# Patient Record
Sex: FEMALE | Race: WHITE | Employment: OTHER | URBAN - NONMETROPOLITAN AREA
[De-identification: names, ages, dates, MRNs, and addresses within clinical notes are randomized per-mention and may not be internally consistent; named-entity substitution may affect disease eponyms.]

---

## 2017-05-25 ENCOUNTER — OFFICE VISIT (OUTPATIENT)
Dept: NEUROLOGY | Age: 77
End: 2017-05-25
Payer: MEDICARE

## 2017-05-25 VITALS
DIASTOLIC BLOOD PRESSURE: 80 MMHG | HEIGHT: 63 IN | HEART RATE: 67 BPM | BODY MASS INDEX: 26.4 KG/M2 | WEIGHT: 149 LBS | SYSTOLIC BLOOD PRESSURE: 120 MMHG | OXYGEN SATURATION: 97 %

## 2017-05-25 DIAGNOSIS — G20 PARKINSON'S DISEASE (HCC): Primary | ICD-10-CM

## 2017-05-25 DIAGNOSIS — R26.9 GAIT ABNORMALITY: ICD-10-CM

## 2017-05-25 DIAGNOSIS — R25.1 TREMOR: ICD-10-CM

## 2017-05-25 PROCEDURE — 99204 OFFICE O/P NEW MOD 45 MIN: CPT | Performed by: PSYCHIATRY & NEUROLOGY

## 2017-11-27 ENCOUNTER — OFFICE VISIT (OUTPATIENT)
Dept: NEUROLOGY | Age: 77
End: 2017-11-27
Payer: MEDICARE

## 2017-11-27 VITALS
BODY MASS INDEX: 27.28 KG/M2 | DIASTOLIC BLOOD PRESSURE: 69 MMHG | WEIGHT: 154 LBS | HEART RATE: 77 BPM | SYSTOLIC BLOOD PRESSURE: 103 MMHG

## 2017-11-27 DIAGNOSIS — R25.1 TREMOR: ICD-10-CM

## 2017-11-27 DIAGNOSIS — R26.9 GAIT ABNORMALITY: ICD-10-CM

## 2017-11-27 DIAGNOSIS — G20 PARKINSON'S DISEASE (HCC): Primary | ICD-10-CM

## 2017-11-27 PROCEDURE — 99214 OFFICE O/P EST MOD 30 MIN: CPT | Performed by: PSYCHIATRY & NEUROLOGY

## 2017-11-27 NOTE — PROGRESS NOTES
Review of Systems    Constitutional  No fever or chills. No diaphoresis or significant fatigue. HENT   No tinnitus or significant hearing loss. Eyes  no sudden vision change or eye pain  Respiratory  no significant shortness of breath or cough  Cardiovascular  no chest pain No palpitations or significant leg swelling  Gastrointestinal  no abdominal swelling or pain. Genitourinary  No difficulty urinating, dysuria  Musculoskeletal  yes back pain or myalgia. Skin  no color change or rash  Neurologic  No seizures. No lateralizing weakness. Hematologic  no easy bruising or excessive bleeding. Psychiatric  no severe anxiety or nervousness. All other review of systems are negative.

## 2017-11-27 NOTE — PROGRESS NOTES
tinnitus or significant hearing loss. Eyes  no sudden vision change or eye pain  Respiratory  no significant shortness of breath or cough  Cardiovascular  no chest pain No palpitations or significant leg swelling  Gastrointestinal  no abdominal swelling or pain. Genitourinary  No difficulty urinating, dysuria  Musculoskeletal  yes back pain or myalgia. Skin  no color change or rash  Neurologic  No seizures. No lateralizing weakness. Hematologic  no easy bruising or excessive bleeding. Psychiatric  no severe anxiety or nervousness. All other review of systems are negative. Current Outpatient Prescriptions   Medication Sig Dispense Refill    carbidopa-levodopa (SINEMET)  MG per tablet Take 2 tablets by mouth 3 times daily 180 tablet 11     No current facility-administered medications for this visit. /69   Pulse 77   Wt 154 lb (69.9 kg)   BMI 27.28 kg/m²     Constitutional  well developed, well nourished. Eyes  conjunctiva normal.   Ear, nose, throat - No scars, masses, or lesions over external nose or ears, no atrophy of tongue  Neck-symmetric, no masses noted, no jugular vein distension  Respiration- chest wall appears symmetric, good expansion,   normal effort without use of accessory muscles  Musculoskeletal  no significant wasting of muscles noted, no bony deformities  Extremities-no clubbing, cyanosis or edema  Skin  warm, dry, and intact. No rash, erythema, or pallor.   Psychiatric  mood, affect, and behavior appear normal.      Neurological exam  Awake, alert, fluent oriented x 3 appropriate affect  Attention and concentration appear appropriate  Recent and remote memory appears unremarkable  Speech normal without dysarthria  No clear issues with language of fund of knowledge    Cranial Nerve Exam     CN III, IV,VI-EOMI, No nystagmus, conjugate eye movements, no ptosis    CN VII-no facial assymetry    Motor Exam  V/V throughout upper and lower extremities bilaterally, some cogwheeling in the wrists, normal tone      Tremors- resting tremor in both arms    Gait  Slightly slow with decreased arm swing and resting tremor in both arms        No results found for: [id number]  No results found for: WBC, HGB, HCT, MCV, PLT  No results found for: NA, K, CL, CO2, BUN, CREATININE, GLUCOSE, CALCIUM, PROT, LABALBU, BILITOT, ALKPHOS, AST, ALT, LABGLOM, GFRAA, AGRATIO, GLOB        Assessment    ICD-10-CM ICD-9-CM    1. [facility] G20 332.0    2. Tremor R25.1 781.0    3. Gait abnormality R26.9 781.2        Her neurological examination today was significant for a resting tremor in both hands. She had some mild cogwheeling. Her gait was slightly slowed with reduced arm swing. Based upon her history and examination, her symptoms appear to be consistent with Parkinson's disease. At this time. She was instructed to take her dose of medicines at 7 AM, noon, and 5 PM rather than at bedtime. She is to continue with her parkinsons boxing therapy. Continue present care. No further recommendations are provided. She is to follow-up with me in approximately 6 months and call with any further problems. Plan    No orders of the defined types were placed in this encounter. No orders of the defined types were placed in this encounter. Return in about 6 months (around 5/27/2018).

## 2018-07-25 ENCOUNTER — TELEPHONE (OUTPATIENT)
Dept: NEUROLOGY | Age: 78
End: 2018-07-25

## 2018-08-23 ENCOUNTER — OFFICE VISIT (OUTPATIENT)
Dept: NEUROLOGY | Age: 78
End: 2018-08-23
Payer: MEDICARE

## 2018-08-23 VITALS
SYSTOLIC BLOOD PRESSURE: 134 MMHG | HEIGHT: 64 IN | BODY MASS INDEX: 25.97 KG/M2 | DIASTOLIC BLOOD PRESSURE: 81 MMHG | WEIGHT: 152.13 LBS | HEART RATE: 97 BPM

## 2018-08-23 DIAGNOSIS — R25.1 TREMOR: ICD-10-CM

## 2018-08-23 DIAGNOSIS — G20 PARKINSON'S DISEASE (HCC): Primary | ICD-10-CM

## 2018-08-23 DIAGNOSIS — R26.9 GAIT ABNORMALITY: ICD-10-CM

## 2018-08-23 PROCEDURE — 99214 OFFICE O/P EST MOD 30 MIN: CPT | Performed by: PSYCHIATRY & NEUROLOGY

## 2018-08-23 NOTE — PROGRESS NOTES
Chief Complaint   Patient presents with    Tremors     6 month follow up        Ramona Hill is a 69y.o. year old female who is seen for evaluation of Parkinson's disease. The patient indicates that she was diagnosed with Parkinson's disease in 2009. At that time she had some neck stiffness, along with some numbness in the left arm. She had tremor but soon developed, in both arms. Her walking slightly slowed. She was seen by Dr. Alida Sharma in [facility]. She indicates she had an MRI of the brain, which is unremarkable. There is no family history of Parkinson's. She denies any significant balance issues, although her gait is slower. She denies any dysphasia or dysarthria. There are no cognitive issues. She was started on Sinemet and slowly this was increased. She is currently on 2 tablets 3 times a day. Doing better taking her meds at 5 pm. Feels symptoms are stable. Active Ambulatory Problems     Diagnosis Date Noted    Parkinson's disease ([facility] 05/25/2017    Tremor 05/25/2017    Gait abnormality 05/25/2017     Resolved Ambulatory Problems     Diagnosis Date Noted    No Resolved Ambulatory Problems     Past Medical History:   Diagnosis Date    Parkinson's disease [facility])        Past Surgical History:   Procedure Laterality Date    APPENDECTOMY      CHOLECYSTECTOMY      HYSTERECTOMY         History reviewed. No pertinent family history. No Known Allergies    Social History     Social History    Marital status: Unknown     Spouse name: N/A    Number of children: N/A    Years of education: N/A     Occupational History    Not on file. Social History Main Topics    Smoking status: Never Smoker    Smokeless tobacco: Never Used    Alcohol use Yes      Comment: occas    Drug use: Unknown    Sexual activity: Not on file     Other Topics Concern    Not on file     Social History Narrative    No narrative on file       Review of Systems     Constitutional  No fever or chills.   No diaphoresis or assymetry    Motor Exam  V/V throughout upper and lower extremities bilaterally, some cogwheeling in the wrists, normal tone      Tremors- resting tremor in both arms    Gait  Slightly slow with decreased arm swing and resting tremor in both arms        No results found for: [id number]  No results found for: WBC, HGB, HCT, MCV, PLT  No results found for: NA, K, CL, CO2, BUN, CREATININE, GLUCOSE, CALCIUM, PROT, LABALBU, BILITOT, ALKPHOS, AST, ALT, LABGLOM, GFRAA, AGRATIO, GLOB        Assessment    ICD-10-CM ICD-9-CM    1. Parkinson's disease ([facility] G20 332.0    2. Tremor R25.1 781.0    3. Gait abnormality R26.9 781.2        Her neurological examination today was significant for a resting tremor in both hands. She had some mild cogwheeling. Her gait was slightly slowed with reduced arm swing. Based upon her history and examination, her symptoms appear to be consistent with Parkinson's disease. At this time. She was instructed to take her dose of medicines at 7 AM, noon, and 5 PM rather than at bedtime. She is to continue with her parkinsons boxing therapy. Continue currencare. No further recommendations are provided. She is to follow-up with me in approximately 6 months and call with any further problems. Plan    No orders of the defined types were placed in this encounter. Orders Placed This Encounter   Medications    carbidopa-levodopa (SINEMET)  MG per tablet     Sig: Take 2 tablets by mouth 3 times daily     Dispense:  540 tablet     Refill:  3       Return in about 6 months (around 2/23/2019).

## 2019-02-26 ENCOUNTER — OFFICE VISIT (OUTPATIENT)
Dept: NEUROLOGY | Age: 79
End: 2019-02-26
Payer: MEDICARE

## 2019-02-26 VITALS
BODY MASS INDEX: 25.44 KG/M2 | DIASTOLIC BLOOD PRESSURE: 76 MMHG | WEIGHT: 149 LBS | HEART RATE: 91 BPM | HEIGHT: 64 IN | SYSTOLIC BLOOD PRESSURE: 115 MMHG

## 2019-02-26 DIAGNOSIS — R25.1 TREMOR: ICD-10-CM

## 2019-02-26 DIAGNOSIS — G20 PARKINSON'S DISEASE (HCC): Primary | ICD-10-CM

## 2019-02-26 DIAGNOSIS — R26.9 GAIT ABNORMALITY: ICD-10-CM

## 2019-02-26 PROCEDURE — 99213 OFFICE O/P EST LOW 20 MIN: CPT | Performed by: PSYCHIATRY & NEUROLOGY

## 2021-06-27 ENCOUNTER — HOSPITAL ENCOUNTER (INPATIENT)
Age: 81
LOS: 4 days | Discharge: NURSING, REHAB OR LONG-TERM CARE | DRG: 481 | End: 2021-07-01
Attending: HOSPITALIST
Payer: MEDICARE

## 2021-06-27 ENCOUNTER — APPOINTMENT (OUTPATIENT)
Dept: GENERAL RADIOLOGY | Age: 81
DRG: 481 | End: 2021-06-27
Attending: HOSPITALIST
Payer: MEDICARE

## 2021-06-27 DIAGNOSIS — S72.002A CLOSED LEFT HIP FRACTURE, INITIAL ENCOUNTER (HCC): Primary | ICD-10-CM

## 2021-06-27 LAB
ABO/RH: NORMAL
ALBUMIN SERPL-MCNC: 3.9 G/DL (ref 3.5–5.2)
ALP BLD-CCNC: 68 U/L (ref 35–104)
ALT SERPL-CCNC: 9 U/L (ref 5–33)
ANION GAP SERPL CALCULATED.3IONS-SCNC: 11 MMOL/L (ref 7–19)
ANTIBODY SCREEN: NORMAL
APTT: 24.5 SEC (ref 26–36.2)
AST SERPL-CCNC: 20 U/L (ref 5–32)
BASOPHILS ABSOLUTE: 0 K/UL (ref 0–0.2)
BASOPHILS RELATIVE PERCENT: 0.3 % (ref 0–1)
BILIRUB SERPL-MCNC: 0.7 MG/DL (ref 0.2–1.2)
BUN BLDV-MCNC: 15 MG/DL (ref 8–23)
CALCIUM SERPL-MCNC: 8.7 MG/DL (ref 8.8–10.2)
CHLORIDE BLD-SCNC: 105 MMOL/L (ref 98–111)
CO2: 25 MMOL/L (ref 22–29)
CREAT SERPL-MCNC: 0.5 MG/DL (ref 0.5–0.9)
EOSINOPHILS ABSOLUTE: 0 K/UL (ref 0–0.6)
EOSINOPHILS RELATIVE PERCENT: 0.2 % (ref 0–5)
GFR AFRICAN AMERICAN: >59
GFR NON-AFRICAN AMERICAN: >60
GLUCOSE BLD-MCNC: 110 MG/DL (ref 74–109)
HCT VFR BLD CALC: 35.4 % (ref 37–47)
HEMOGLOBIN: 11.5 G/DL (ref 12–16)
IMMATURE GRANULOCYTES #: 0.1 K/UL
INR BLD: 0.98 (ref 0.88–1.18)
LYMPHOCYTES ABSOLUTE: 1.3 K/UL (ref 1.1–4.5)
LYMPHOCYTES RELATIVE PERCENT: 11.1 % (ref 20–40)
MCH RBC QN AUTO: 30.2 PG (ref 27–31)
MCHC RBC AUTO-ENTMCNC: 32.5 G/DL (ref 33–37)
MCV RBC AUTO: 92.9 FL (ref 81–99)
MONOCYTES ABSOLUTE: 0.8 K/UL (ref 0–0.9)
MONOCYTES RELATIVE PERCENT: 7.3 % (ref 0–10)
NEUTROPHILS ABSOLUTE: 9.1 K/UL (ref 1.5–7.5)
NEUTROPHILS RELATIVE PERCENT: 80.6 % (ref 50–65)
PDW BLD-RTO: 13.7 % (ref 11.5–14.5)
PLATELET # BLD: 248 K/UL (ref 130–400)
PMV BLD AUTO: 10 FL (ref 9.4–12.3)
POTASSIUM REFLEX MAGNESIUM: 4.7 MMOL/L (ref 3.5–5)
PROTHROMBIN TIME: 13.2 SEC (ref 12–14.6)
RBC # BLD: 3.81 M/UL (ref 4.2–5.4)
SODIUM BLD-SCNC: 141 MMOL/L (ref 136–145)
TOTAL PROTEIN: 7.1 G/DL (ref 6.6–8.7)
VITAMIN D 25-HYDROXY: 14.8 NG/ML
WBC # BLD: 11.3 K/UL (ref 4.8–10.8)

## 2021-06-27 PROCEDURE — 85610 PROTHROMBIN TIME: CPT

## 2021-06-27 PROCEDURE — 86900 BLOOD TYPING SEROLOGIC ABO: CPT

## 2021-06-27 PROCEDURE — 6360000002 HC RX W HCPCS: Performed by: STUDENT IN AN ORGANIZED HEALTH CARE EDUCATION/TRAINING PROGRAM

## 2021-06-27 PROCEDURE — 6370000000 HC RX 637 (ALT 250 FOR IP): Performed by: HOSPITALIST

## 2021-06-27 PROCEDURE — 86901 BLOOD TYPING SEROLOGIC RH(D): CPT

## 2021-06-27 PROCEDURE — 2580000003 HC RX 258: Performed by: STUDENT IN AN ORGANIZED HEALTH CARE EDUCATION/TRAINING PROGRAM

## 2021-06-27 PROCEDURE — 86850 RBC ANTIBODY SCREEN: CPT

## 2021-06-27 PROCEDURE — 2580000003 HC RX 258: Performed by: HOSPITALIST

## 2021-06-27 PROCEDURE — 82306 VITAMIN D 25 HYDROXY: CPT

## 2021-06-27 PROCEDURE — 1210000000 HC MED SURG R&B

## 2021-06-27 PROCEDURE — 36415 COLL VENOUS BLD VENIPUNCTURE: CPT

## 2021-06-27 PROCEDURE — 73521 X-RAY EXAM HIPS BI 2 VIEWS: CPT

## 2021-06-27 PROCEDURE — 73552 X-RAY EXAM OF FEMUR 2/>: CPT

## 2021-06-27 PROCEDURE — 85730 THROMBOPLASTIN TIME PARTIAL: CPT

## 2021-06-27 PROCEDURE — 80053 COMPREHEN METABOLIC PANEL: CPT

## 2021-06-27 PROCEDURE — 85025 COMPLETE CBC W/AUTO DIFF WBC: CPT

## 2021-06-27 PROCEDURE — 6370000000 HC RX 637 (ALT 250 FOR IP): Performed by: STUDENT IN AN ORGANIZED HEALTH CARE EDUCATION/TRAINING PROGRAM

## 2021-06-27 RX ORDER — LANOLIN ALCOHOL/MO/W.PET/CERES
50 CREAM (GRAM) TOPICAL DAILY
COMMUNITY

## 2021-06-27 RX ORDER — ONDANSETRON 4 MG/1
4 TABLET, ORALLY DISINTEGRATING ORAL EVERY 8 HOURS PRN
Status: DISCONTINUED | OUTPATIENT
Start: 2021-06-27 | End: 2021-06-28 | Stop reason: SDUPTHER

## 2021-06-27 RX ORDER — M-VIT,TX,IRON,MINS/CALC/FOLIC 27MG-0.4MG
1 TABLET ORAL DAILY
COMMUNITY

## 2021-06-27 RX ORDER — MECOBALAMIN 5000 MCG
5 TABLET,DISINTEGRATING ORAL NIGHTLY PRN
Status: DISCONTINUED | OUTPATIENT
Start: 2021-06-27 | End: 2021-07-01 | Stop reason: HOSPADM

## 2021-06-27 RX ORDER — SODIUM CHLORIDE 9 MG/ML
25 INJECTION, SOLUTION INTRAVENOUS PRN
Status: DISCONTINUED | OUTPATIENT
Start: 2021-06-27 | End: 2021-06-28 | Stop reason: SDUPTHER

## 2021-06-27 RX ORDER — ONDANSETRON 2 MG/ML
4 INJECTION INTRAMUSCULAR; INTRAVENOUS EVERY 6 HOURS PRN
Status: DISCONTINUED | OUTPATIENT
Start: 2021-06-27 | End: 2021-06-28 | Stop reason: SDUPTHER

## 2021-06-27 RX ORDER — NALOXONE HYDROCHLORIDE 0.4 MG/ML
0.4 INJECTION, SOLUTION INTRAMUSCULAR; INTRAVENOUS; SUBCUTANEOUS PRN
Status: DISCONTINUED | OUTPATIENT
Start: 2021-06-27 | End: 2021-07-01 | Stop reason: HOSPADM

## 2021-06-27 RX ORDER — SODIUM CHLORIDE 0.9 % (FLUSH) 0.9 %
5-40 SYRINGE (ML) INJECTION EVERY 12 HOURS SCHEDULED
Status: DISCONTINUED | OUTPATIENT
Start: 2021-06-27 | End: 2021-06-28 | Stop reason: SDUPTHER

## 2021-06-27 RX ORDER — SODIUM CHLORIDE 9 MG/ML
INJECTION, SOLUTION INTRAVENOUS CONTINUOUS
Status: DISCONTINUED | OUTPATIENT
Start: 2021-06-27 | End: 2021-07-01 | Stop reason: HOSPADM

## 2021-06-27 RX ORDER — MORPHINE SULFATE 4 MG/ML
4 INJECTION, SOLUTION INTRAMUSCULAR; INTRAVENOUS EVERY 4 HOURS PRN
Status: DISCONTINUED | OUTPATIENT
Start: 2021-06-27 | End: 2021-07-01 | Stop reason: HOSPADM

## 2021-06-27 RX ORDER — MORPHINE SULFATE 4 MG/ML
2 INJECTION, SOLUTION INTRAMUSCULAR; INTRAVENOUS EVERY 4 HOURS PRN
Status: DISCONTINUED | OUTPATIENT
Start: 2021-06-27 | End: 2021-07-01 | Stop reason: HOSPADM

## 2021-06-27 RX ORDER — MORPHINE SULFATE 4 MG/ML
2 INJECTION, SOLUTION INTRAMUSCULAR; INTRAVENOUS
Status: DISCONTINUED | OUTPATIENT
Start: 2021-06-27 | End: 2021-06-27

## 2021-06-27 RX ORDER — SODIUM CHLORIDE 0.9 % (FLUSH) 0.9 %
5-40 SYRINGE (ML) INJECTION PRN
Status: DISCONTINUED | OUTPATIENT
Start: 2021-06-27 | End: 2021-06-28 | Stop reason: SDUPTHER

## 2021-06-27 RX ORDER — POLYETHYLENE GLYCOL 3350 17 G/17G
17 POWDER, FOR SOLUTION ORAL DAILY PRN
Status: DISCONTINUED | OUTPATIENT
Start: 2021-06-27 | End: 2021-07-01 | Stop reason: HOSPADM

## 2021-06-27 RX ADMIN — Medication 2 MG: at 09:24

## 2021-06-27 RX ADMIN — SODIUM CHLORIDE: 9 INJECTION, SOLUTION INTRAVENOUS at 20:01

## 2021-06-27 RX ADMIN — CARBIDOPA AND LEVODOPA 2 TABLET: 25; 100 TABLET ORAL at 22:15

## 2021-06-27 RX ADMIN — Medication 2 MG: at 13:41

## 2021-06-27 RX ADMIN — CARBIDOPA AND LEVODOPA 2 TABLET: 25; 100 TABLET ORAL at 17:49

## 2021-06-27 RX ADMIN — Medication 4 MG: at 22:15

## 2021-06-27 RX ADMIN — SODIUM CHLORIDE, PRESERVATIVE FREE 10 ML: 5 INJECTION INTRAVENOUS at 22:15

## 2021-06-27 RX ADMIN — Medication 5 MG: at 22:15

## 2021-06-27 RX ADMIN — Medication 4 MG: at 17:38

## 2021-06-27 ASSESSMENT — PAIN SCALES - GENERAL
PAINLEVEL_OUTOF10: 0
PAINLEVEL_OUTOF10: 10
PAINLEVEL_OUTOF10: 5
PAINLEVEL_OUTOF10: 10
PAINLEVEL_OUTOF10: 0
PAINLEVEL_OUTOF10: 9
PAINLEVEL_OUTOF10: 0

## 2021-06-27 NOTE — PLAN OF CARE
ED Sign Out:    Left Transcervical Femoral Neck Fracture  Orthopaedic service not at [facility]  Referral from Dr. Melissa Flores  Pt Dx:  Left Hip Fracture  Was at home, foot gave way and left hip popped  No orther injuries  \"Wbc 78  Hb 11  BMP all normal  Only med is sinemet\"  EMS gave her fenatnyl 75  Dilaudid 2 at OSH ED  Tanvir@[email or web address] prior to dilaudid, now 148mmHg  RR 19  SpO2 96% on RA      Preliminary Assessments & Plans:    Left Transcervical Hip Fracture:  Admit to [facility]  Narcan PRN patient safety   Dilaudid Pain Scale - NEEDS ORDERED  Type and Screen and PT/INR and PTT and CBC with Diff and CMP today on arrival  CBC and BMP with Mag reflex tomorrow  Ortho consult in AM  NPO from arrival  Ghotra Care  Insert Ghotra    Supportive and Prophylactic Txx:  DVT PPx: Lovenox SQ  GI (PUD) PPx: not indicated  PT: defer to ortho

## 2021-06-27 NOTE — CONSULTS
Orthopaedic Surgery  Inpatient Consultation    Brian Chao ([id number])  6/27/2021      CHIEF COMPLAINT: Left hip pain    History Obtained From: Patient    HISTORY OF PRESENT ILLNESS:                The patient is a 79 y.o. female consulted to the orthopedic service for a left intertrochanteric femur fracture. Patient lives at home with her  she tripped and fell while at home and sustained a left intertrochanteric hip fracture. She denies any other injuries. She lives with her  who has dementia. Past Medical History:        Diagnosis Date    Parkinson's disease [facility])        Past Surgical History:        Procedure Laterality Date    APPENDECTOMY      CHOLECYSTECTOMY      HYSTERECTOMY         Current Medications:   Current Facility-Administered Medications: naloxone (NARCAN) injection 0.4 mg, 0.4 mg, Intravenous, PRN  sodium chloride flush 0.9 % injection 5-40 mL, 5-40 mL, Intravenous, 2 times per day  sodium chloride flush 0.9 % injection 5-40 mL, 5-40 mL, Intravenous, PRN  0.9 % sodium chloride infusion, 25 mL, Intravenous, PRN  [Held by provider] enoxaparin (LOVENOX) injection 40 mg, 40 mg, Subcutaneous, Daily  ondansetron (ZOFRAN-ODT) disintegrating tablet 4 mg, 4 mg, Oral, Q8H PRN **OR** ondansetron (ZOFRAN) injection 4 mg, 4 mg, Intravenous, Q6H PRN  polyethylene glycol (GLYCOLAX) packet 17 g, 17 g, Oral, Daily PRN  melatonin disintegrating tablet 5 mg, 5 mg, Oral, Nightly PRN  morphine injection 2 mg, 2 mg, Intravenous, Q3H PRN  [START ON 6/28/2021] carbidopa-levodopa (SINEMET)  MG per tablet 2 tablet, 2 tablet, Oral, TID  Prior to Admission medications    Medication Sig Start Date End Date Taking? Authorizing Provider   carbidopa-levodopa (SINEMET)  MG per tablet TAKE 2 TABLETS BY MOUTH 3 TIMES A DAY 9/9/19   Nghia Wu MD       Allergies:  Patient has no known allergies.     Social History:   Social History     Socioeconomic History    Marital status: Unknown Spouse name: Not on file    Number of children: Not on file    Years of education: Not on file    Highest education level: Not on file   Occupational History    Not on file   Tobacco Use    Smoking status: Never Smoker    Smokeless tobacco: Never Used   Substance and Sexual Activity    Alcohol use: Yes     Comment: occas    Drug use: Not on file    Sexual activity: Not on file   Other Topics Concern    Not on file   Social History Narrative    Not on file     Social Determinants of Health     Financial Resource Strain:     Difficulty of Paying Living Expenses:    Food Insecurity:     Worried About Running Out of Food in the Last Year:     [address] in the Last Year:    Transportation Needs:     Lack of Transportation (Medical):  Lack of Transportation (Non-Medical):    Physical Activity:     Days of Exercise per Week:     Minutes of Exercise per Session:    Stress:     Feeling of Stress :    Social Connections:     Frequency of Communication with Friends and Family:     Frequency of Social Gatherings with Friends and Family:     Attends Taoism Services:     Active Member of Clubs or Organizations:     Attends Club or Organization Meetings:     Marital Status:    Intimate Partner Violence:     Fear of Current or Ex-Partner:     Emotionally Abused:     Physically Abused:     Sexually Abused:        Family History:   No family history on file.     REVIEW OF SYSTEMS:    CONSTITUTIONAL:  negative for  fevers, chills, sweats, fatigue, malaise, anorexia and weight loss  EYES:  negative for  double vision, blurred vision and visual disturbance  HEENT:  negative for  hearing loss, tinnitus, ear drainage, earaches, nasal congestion, epistaxis, snoring, sore mouth, sore throat, hoarseness and voice change  RESPIRATORY:  negative for  dry cough, cough with sputum, dyspnea, wheezing, hemoptysis, chest pain, pleuritic pain and cyanosis  CARDIOVASCULAR:  negative for  chest pain, palpitations, orthopnea, exertional chest pressure/discomfort, edema  GASTROINTESTINAL:  negative for nausea, vomiting, change in bowel habits, diarrhea, constipation, abdominal pain, jaundice, dysphagia, regurgitation, hematemesis and hemtochezia  GENITOURINARY:  negative for frequency, dysuria, nocturia, hesitancy and hematuria  INTEGUMENT/BREAST:  negative for rash, skin lesion(s), dryness, skin color change, pruritus, changes in hair and changes in nails  HEMATOLOGIC/LYMPHATIC:  negative for easy bruising, bleeding, lymphadenopathy, petechiae and swelling/edema  ALLERGIC/IMMUNOLOGIC:  negative for recurrent infections and urticaria  ENDOCRINE:  negative for heat intolerance, cold intolerance, tremor, weight changes, hair loss and diabetic symptoms including neither polyuria nor polydipsia  MUSCULOSKELETAL: Left hip pain  NEUROLOGICAL:  negative for headaches, dizziness, seizures, memory problems, speech problems, visual disturbance, coordination problems, gait problems, tremor, syncope and near syncope  BEHAVIOR/PSYCH:  negative for depressed mood, elated mood, increased agitation and anxiety    PHYSICAL EXAM:    Vitals:   Vitals:    06/27/21 0830 06/27/21 1036   BP: 125/82 134/82   Pulse: 100 92   Resp: 18 16   Temp: 97.8 °F (36.6 °C) 97.3 °F (36.3 °C)   TempSrc: Temporal Temporal   SpO2:  97%     General:  Appears stated age, no distress. Orientation:  Alert and oriented to time, place, and person. Mood and Affect:  Cooperative and pleasant. Gait:  Resting comfortably in bed. Cardiovascular:  Symmetric 1-2 plus pulses in upper and lower extremities. Lymph:  No cervical or inguinal lymphadenopathy noted. Sensation:  Grossly intact to light touch. DTR:  Normal, no pathologic reflexes. Coordination/balance:  Normal    Musculoskeletal:  Left hip shortened and externally rotated.       DATA:    CBC with Differential:    Lab Results   Component Value Date    WBC 11.3 06/27/2021    RBC 3.81 06/27/2021    HGB 11.5 06/27/2021    HCT 35.4 06/27/2021     06/27/2021    MCV 92.9 06/27/2021    MCH 30.2 06/27/2021    MCHC 32.5 06/27/2021    RDW 13.7 06/27/2021    LYMPHOPCT 11.1 06/27/2021    MONOPCT 7.3 06/27/2021    BASOPCT 0.3 06/27/2021    MONOSABS 0.80 06/27/2021    LYMPHSABS 1.3 06/27/2021    EOSABS 0.00 06/27/2021    BASOSABS 0.00 06/27/2021     CMP:    Lab Results   Component Value Date     06/27/2021    K 4.7 06/27/2021     06/27/2021    CO2 25 06/27/2021    BUN 15 06/27/2021    CREATININE 0.5 06/27/2021    GFRAA >59 06/27/2021    LABGLOM >60 06/27/2021    GLUCOSE 110 06/27/2021    PROT 7.1 06/27/2021    CALCIUM 8.7 06/27/2021    BILITOT 0.7 06/27/2021    ALKPHOS 68 06/27/2021    AST 20 06/27/2021    ALT 9 06/27/2021     BMP:    Lab Results   Component Value Date     06/27/2021    K 4.7 06/27/2021     06/27/2021    CO2 25 06/27/2021    BUN 15 06/27/2021    CREATININE 0.5 06/27/2021    CALCIUM 8.7 06/27/2021    GFRAA >59 06/27/2021    LABGLOM >60 06/27/2021    GLUCOSE 110 06/27/2021       Radiology: Left intertrochanteric hip fracture      IMPRESSION/RECOMMENDATIONS:    Assessment: Displaced left intertrochanteric femur fracture    Plan:  1. Cephallomedullary nailing of a displaced left intertrochanteric femur fracture  Monday morning. 2.  NPO after MN.     Dione Beavers MD 06/27/21 11:30 AM

## 2021-06-27 NOTE — H&P
Physical Activity:     Days of Exercise per Week:     Minutes of Exercise per Session:    Stress:     Feeling of Stress :    Social Connections:     Frequency of Communication with Friends and Family:     Frequency of Social Gatherings with Friends and Family:     Attends Jainism Services:     Active Member of Clubs or Organizations:     Attends Club or Organization Meetings:     Marital Status:    Intimate Partner Violence:     Fear of Current or Ex-Partner:     Emotionally Abused:     Physically Abused:     Sexually Abused:      No Known Allergies  Prior to Admission medications    Medication Sig Start Date End Date Taking? Authorizing Provider   carbidopa-levodopa (SINEMET)  MG per tablet TAKE 2 TABLETS BY MOUTH 3 TIMES A DAY 9/9/19   Martin Brown MD       I have reviewed all pertinent history. Prior medical records and laboratory evaluation reviewed. Imaging independently reviewed. Review of Systems:   As per HPI, otherwise all other ROS performed and found to be negative at this time. Physical Exam:  There were no vitals filed for this visit. CONSTITUTIONAL: Uncomfortable due to pain, but no distress  PSYCH: Judgement and insight are normal. Mental status alert and oriented to person, place and time. Mood and affect are normal  EYES: KYUNG, symmetrical. Conjunctiva are moist, non-icteric. Lids are normal  ENT: No abnormalities  NECK: Trachea is midline. Supple, nontender  Head: Normocephalic, atraumatic  LUNGS: Normal respiratory effort, no intercostal retractions or accessory muscle use. Clear to auscultation bilaterally with no crackles, wheezes or rales  CARDIOVASCULAR: Normal rate, regular rhythm, pulses equal  GI/ABDOMEN: Soft, non-tender, non-distended, no guarding or rebound. Bowel sounds are normal.   SKIN: Warm and dry.   No rashes  NEUROLOGICAL: Tremor at baseline, mild cogwheel rigidity, alert, follows commands, speech fluent  EXTREMITIES: Limited ROM and tenderness of left hip    Labs: No results found for this or any previous visit (from the past 72 hour(s)). Imaging: No results found. Assessment/Plan:     Principal Problem:    Closed left hip fracture, initial encounter [facility])  Active Problems:    Parkinson's disease ([address]  Resolved Problems:    * No resolved hospital problems.  *       Hip fracture  -Surgical intervention per orthopedic surgery planned for 6/28  -Bedrest  -Pain control  -Supportive care  -PT/OT when appropriate after surgery    Parkinson's disease  -Continue home Sinemet as appropriate    Monitor labs    N.p.o. after midnight    DVT prophylaxis-  Hold  for surgery    Mana Breen MD  6/27/2021 8:40 AM

## 2021-06-27 NOTE — PROGRESS NOTES
PHYSICAL THERAPY        DATE: 6/27/2021    Received order for physical therapy evaluation. The evaluation was attempted but was unable to be completed due to:     [x] The patient currently has an order for bed rest and has an ortho consult to evaluate hip fracture. We will follow up per ortho orders after consult. [] The patient declined.     [] The patient was unavailable. [] Nursing declined.     [] Waiting on clarification orders from Ortho / Neuro     [] The patient is currently restrained. Due to facility policy on restraints physical therapy is deferred when a patient is restrained.         Electronically signed by Carrol Mendez PT on 6/27/2021 at 9:39 AM

## 2021-06-27 NOTE — LETTER
Jenae Vernon,  at [facility]  [phone] phone  [phone] fax  [phone] CELL ([address])        Jenae Vernon  at [place] [phone] phone  [phone] fax  [phone] CELL ([address])

## 2021-06-28 ENCOUNTER — ANESTHESIA (OUTPATIENT)
Dept: OPERATING ROOM | Age: 81
DRG: 481 | End: 2021-06-28
Payer: MEDICARE

## 2021-06-28 ENCOUNTER — APPOINTMENT (OUTPATIENT)
Dept: GENERAL RADIOLOGY | Age: 81
DRG: 481 | End: 2021-06-28
Attending: HOSPITALIST
Payer: MEDICARE

## 2021-06-28 ENCOUNTER — ANESTHESIA EVENT (OUTPATIENT)
Dept: OPERATING ROOM | Age: 81
DRG: 481 | End: 2021-06-28
Payer: MEDICARE

## 2021-06-28 VITALS — TEMPERATURE: 97.7 F | SYSTOLIC BLOOD PRESSURE: 123 MMHG | OXYGEN SATURATION: 100 % | DIASTOLIC BLOOD PRESSURE: 74 MMHG

## 2021-06-28 LAB
ANION GAP SERPL CALCULATED.3IONS-SCNC: 10 MMOL/L (ref 7–19)
BUN BLDV-MCNC: 15 MG/DL (ref 8–23)
CALCIUM SERPL-MCNC: 8 MG/DL (ref 8.8–10.2)
CHLORIDE BLD-SCNC: 103 MMOL/L (ref 98–111)
CO2: 26 MMOL/L (ref 22–29)
CREAT SERPL-MCNC: 0.5 MG/DL (ref 0.5–0.9)
EKG P AXIS: 59 DEGREES
EKG P-R INTERVAL: 240 MS
EKG Q-T INTERVAL: 358 MS
EKG QRS DURATION: 82 MS
EKG QTC CALCULATION (BAZETT): 415 MS
EKG T AXIS: 94 DEGREES
GFR AFRICAN AMERICAN: >59
GFR NON-AFRICAN AMERICAN: >60
GLUCOSE BLD-MCNC: 96 MG/DL (ref 74–109)
HCT VFR BLD CALC: 33.3 % (ref 37–47)
HEMOGLOBIN: 10.6 G/DL (ref 12–16)
MCH RBC QN AUTO: 30.1 PG (ref 27–31)
MCHC RBC AUTO-ENTMCNC: 31.8 G/DL (ref 33–37)
MCV RBC AUTO: 94.6 FL (ref 81–99)
PDW BLD-RTO: 13.7 % (ref 11.5–14.5)
PLATELET # BLD: 191 K/UL (ref 130–400)
PMV BLD AUTO: 10.1 FL (ref 9.4–12.3)
POTASSIUM REFLEX MAGNESIUM: 4.4 MMOL/L (ref 3.5–5)
RBC # BLD: 3.52 M/UL (ref 4.2–5.4)
SODIUM BLD-SCNC: 139 MMOL/L (ref 136–145)
WBC # BLD: 8.3 K/UL (ref 4.8–10.8)

## 2021-06-28 PROCEDURE — 3600000004 HC SURGERY LEVEL 4 BASE: Performed by: ORTHOPAEDIC SURGERY

## 2021-06-28 PROCEDURE — 6370000000 HC RX 637 (ALT 250 FOR IP): Performed by: STUDENT IN AN ORGANIZED HEALTH CARE EDUCATION/TRAINING PROGRAM

## 2021-06-28 PROCEDURE — 6360000002 HC RX W HCPCS: Performed by: STUDENT IN AN ORGANIZED HEALTH CARE EDUCATION/TRAINING PROGRAM

## 2021-06-28 PROCEDURE — 80048 BASIC METABOLIC PNL TOTAL CA: CPT

## 2021-06-28 PROCEDURE — 2500000003 HC RX 250 WO HCPCS: Performed by: NURSE ANESTHETIST, CERTIFIED REGISTERED

## 2021-06-28 PROCEDURE — 73502 X-RAY EXAM HIP UNI 2-3 VIEWS: CPT

## 2021-06-28 PROCEDURE — 93010 ELECTROCARDIOGRAM REPORT: CPT | Performed by: INTERNAL MEDICINE

## 2021-06-28 PROCEDURE — 3700000001 HC ADD 15 MINUTES (ANESTHESIA): Performed by: ORTHOPAEDIC SURGERY

## 2021-06-28 PROCEDURE — 0QH706Z INSERTION OF INTRAMEDULLARY INTERNAL FIXATION DEVICE INTO LEFT UPPER FEMUR, OPEN APPROACH: ICD-10-PCS | Performed by: ORTHOPAEDIC SURGERY

## 2021-06-28 PROCEDURE — 6360000002 HC RX W HCPCS: Performed by: NURSE ANESTHETIST, CERTIFIED REGISTERED

## 2021-06-28 PROCEDURE — 2580000003 HC RX 258: Performed by: NURSE ANESTHETIST, CERTIFIED REGISTERED

## 2021-06-28 PROCEDURE — 2709999900 HC NON-CHARGEABLE SUPPLY: Performed by: ORTHOPAEDIC SURGERY

## 2021-06-28 PROCEDURE — 93005 ELECTROCARDIOGRAM TRACING: CPT | Performed by: ANESTHESIOLOGY

## 2021-06-28 PROCEDURE — 2580000003 HC RX 258: Performed by: ORTHOPAEDIC SURGERY

## 2021-06-28 PROCEDURE — 2580000003 HC RX 258: Performed by: STUDENT IN AN ORGANIZED HEALTH CARE EDUCATION/TRAINING PROGRAM

## 2021-06-28 PROCEDURE — 3209999900 FLUORO FOR SURGICAL PROCEDURES

## 2021-06-28 PROCEDURE — 2500000003 HC RX 250 WO HCPCS

## 2021-06-28 PROCEDURE — 85027 COMPLETE CBC AUTOMATED: CPT

## 2021-06-28 PROCEDURE — C1713 ANCHOR/SCREW BN/BN,TIS/BN: HCPCS | Performed by: ORTHOPAEDIC SURGERY

## 2021-06-28 PROCEDURE — 6370000000 HC RX 637 (ALT 250 FOR IP): Performed by: ORTHOPAEDIC SURGERY

## 2021-06-28 PROCEDURE — 6360000002 HC RX W HCPCS

## 2021-06-28 PROCEDURE — 3600000014 HC SURGERY LEVEL 4 ADDTL 15MIN: Performed by: ORTHOPAEDIC SURGERY

## 2021-06-28 PROCEDURE — C1769 GUIDE WIRE: HCPCS | Performed by: ORTHOPAEDIC SURGERY

## 2021-06-28 PROCEDURE — 1210000000 HC MED SURG R&B

## 2021-06-28 PROCEDURE — 2720000010 HC SURG SUPPLY STERILE: Performed by: ORTHOPAEDIC SURGERY

## 2021-06-28 PROCEDURE — 7100000000 HC PACU RECOVERY - FIRST 15 MIN: Performed by: ORTHOPAEDIC SURGERY

## 2021-06-28 PROCEDURE — 3700000000 HC ANESTHESIA ATTENDED CARE: Performed by: ORTHOPAEDIC SURGERY

## 2021-06-28 PROCEDURE — 7100000001 HC PACU RECOVERY - ADDTL 15 MIN: Performed by: ORTHOPAEDIC SURGERY

## 2021-06-28 PROCEDURE — 6360000002 HC RX W HCPCS: Performed by: ORTHOPAEDIC SURGERY

## 2021-06-28 PROCEDURE — 36415 COLL VENOUS BLD VENIPUNCTURE: CPT

## 2021-06-28 DEVICE — SCREW BNE L34MM DIA5MM TIB LT GRN TI ST CANN LOK FULL THRD: Type: IMPLANTABLE DEVICE | Site: FEMUR | Status: FUNCTIONAL

## 2021-06-28 DEVICE — IMPLANTABLE DEVICE: Type: IMPLANTABLE DEVICE | Site: FEMUR | Status: FUNCTIONAL

## 2021-06-28 RX ORDER — SODIUM CHLORIDE 0.9 % (FLUSH) 0.9 %
5-40 SYRINGE (ML) INJECTION EVERY 12 HOURS SCHEDULED
Status: DISCONTINUED | OUTPATIENT
Start: 2021-06-28 | End: 2021-07-01 | Stop reason: HOSPADM

## 2021-06-28 RX ORDER — PROMETHAZINE HYDROCHLORIDE 25 MG/ML
6.25 INJECTION, SOLUTION INTRAMUSCULAR; INTRAVENOUS
Status: DISCONTINUED | OUTPATIENT
Start: 2021-06-28 | End: 2021-06-28

## 2021-06-28 RX ORDER — SODIUM CHLORIDE 9 MG/ML
25 INJECTION, SOLUTION INTRAVENOUS PRN
Status: DISCONTINUED | OUTPATIENT
Start: 2021-06-28 | End: 2021-07-01 | Stop reason: HOSPADM

## 2021-06-28 RX ORDER — M-VIT,TX,IRON,MINS/CALC/FOLIC 27MG-0.4MG
1 TABLET ORAL DAILY
Status: DISCONTINUED | OUTPATIENT
Start: 2021-06-28 | End: 2021-07-01 | Stop reason: HOSPADM

## 2021-06-28 RX ORDER — MORPHINE SULFATE 4 MG/ML
2 INJECTION, SOLUTION INTRAMUSCULAR; INTRAVENOUS EVERY 5 MIN PRN
Status: DISCONTINUED | OUTPATIENT
Start: 2021-06-28 | End: 2021-06-28

## 2021-06-28 RX ORDER — METOCLOPRAMIDE HYDROCHLORIDE 5 MG/ML
10 INJECTION INTRAMUSCULAR; INTRAVENOUS
Status: DISCONTINUED | OUTPATIENT
Start: 2021-06-28 | End: 2021-06-28

## 2021-06-28 RX ORDER — CEFAZOLIN SODIUM 1 G/50ML
INJECTION, SOLUTION INTRAVENOUS PRN
Status: DISCONTINUED | OUTPATIENT
Start: 2021-06-28 | End: 2021-06-28 | Stop reason: SDUPTHER

## 2021-06-28 RX ORDER — ONDANSETRON 2 MG/ML
4 INJECTION INTRAMUSCULAR; INTRAVENOUS EVERY 6 HOURS PRN
Status: DISCONTINUED | OUTPATIENT
Start: 2021-06-28 | End: 2021-07-01 | Stop reason: HOSPADM

## 2021-06-28 RX ORDER — HYDRALAZINE HYDROCHLORIDE 20 MG/ML
5 INJECTION INTRAMUSCULAR; INTRAVENOUS EVERY 10 MIN PRN
Status: DISCONTINUED | OUTPATIENT
Start: 2021-06-28 | End: 2021-06-28

## 2021-06-28 RX ORDER — MORPHINE SULFATE 4 MG/ML
4 INJECTION, SOLUTION INTRAMUSCULAR; INTRAVENOUS EVERY 5 MIN PRN
Status: DISCONTINUED | OUTPATIENT
Start: 2021-06-28 | End: 2021-06-28

## 2021-06-28 RX ORDER — SUCCINYLCHOLINE CHLORIDE 20 MG/ML
INJECTION INTRAMUSCULAR; INTRAVENOUS PRN
Status: DISCONTINUED | OUTPATIENT
Start: 2021-06-28 | End: 2021-06-28 | Stop reason: SDUPTHER

## 2021-06-28 RX ORDER — SODIUM CHLORIDE 0.9 % (FLUSH) 0.9 %
5-40 SYRINGE (ML) INJECTION PRN
Status: DISCONTINUED | OUTPATIENT
Start: 2021-06-28 | End: 2021-07-01 | Stop reason: HOSPADM

## 2021-06-28 RX ORDER — ONDANSETRON 4 MG/1
4 TABLET, ORALLY DISINTEGRATING ORAL EVERY 8 HOURS PRN
Status: DISCONTINUED | OUTPATIENT
Start: 2021-06-28 | End: 2021-07-01 | Stop reason: HOSPADM

## 2021-06-28 RX ORDER — HYDROMORPHONE HYDROCHLORIDE 1 MG/ML
0.25 INJECTION, SOLUTION INTRAMUSCULAR; INTRAVENOUS; SUBCUTANEOUS EVERY 5 MIN PRN
Status: DISCONTINUED | OUTPATIENT
Start: 2021-06-28 | End: 2021-06-28

## 2021-06-28 RX ORDER — LIDOCAINE HYDROCHLORIDE 10 MG/ML
INJECTION, SOLUTION EPIDURAL; INFILTRATION; INTRACAUDAL; PERINEURAL PRN
Status: DISCONTINUED | OUTPATIENT
Start: 2021-06-28 | End: 2021-06-28 | Stop reason: SDUPTHER

## 2021-06-28 RX ORDER — SODIUM CHLORIDE 9 MG/ML
INJECTION, SOLUTION INTRAVENOUS CONTINUOUS
Status: DISCONTINUED | OUTPATIENT
Start: 2021-06-28 | End: 2021-07-01

## 2021-06-28 RX ORDER — HYDROMORPHONE HYDROCHLORIDE 1 MG/ML
0.5 INJECTION, SOLUTION INTRAMUSCULAR; INTRAVENOUS; SUBCUTANEOUS EVERY 5 MIN PRN
Status: DISCONTINUED | OUTPATIENT
Start: 2021-06-28 | End: 2021-06-28

## 2021-06-28 RX ORDER — FENTANYL CITRATE 50 UG/ML
INJECTION, SOLUTION INTRAMUSCULAR; INTRAVENOUS PRN
Status: DISCONTINUED | OUTPATIENT
Start: 2021-06-28 | End: 2021-06-28 | Stop reason: SDUPTHER

## 2021-06-28 RX ORDER — ERGOCALCIFEROL 1.25 MG/1
50000 CAPSULE ORAL WEEKLY
Status: DISCONTINUED | OUTPATIENT
Start: 2021-06-29 | End: 2021-07-01 | Stop reason: HOSPADM

## 2021-06-28 RX ORDER — ROCURONIUM BROMIDE 10 MG/ML
INJECTION, SOLUTION INTRAVENOUS PRN
Status: DISCONTINUED | OUTPATIENT
Start: 2021-06-28 | End: 2021-06-28 | Stop reason: SDUPTHER

## 2021-06-28 RX ORDER — LABETALOL HYDROCHLORIDE 5 MG/ML
5 INJECTION, SOLUTION INTRAVENOUS EVERY 10 MIN PRN
Status: DISCONTINUED | OUTPATIENT
Start: 2021-06-28 | End: 2021-06-28

## 2021-06-28 RX ORDER — SODIUM CHLORIDE, SODIUM LACTATE, POTASSIUM CHLORIDE, CALCIUM CHLORIDE 600; 310; 30; 20 MG/100ML; MG/100ML; MG/100ML; MG/100ML
INJECTION, SOLUTION INTRAVENOUS CONTINUOUS PRN
Status: DISCONTINUED | OUTPATIENT
Start: 2021-06-28 | End: 2021-06-28 | Stop reason: SDUPTHER

## 2021-06-28 RX ORDER — MEPERIDINE HYDROCHLORIDE 50 MG/ML
12.5 INJECTION INTRAMUSCULAR; INTRAVENOUS; SUBCUTANEOUS EVERY 5 MIN PRN
Status: DISCONTINUED | OUTPATIENT
Start: 2021-06-28 | End: 2021-06-28

## 2021-06-28 RX ORDER — ONDANSETRON 2 MG/ML
INJECTION INTRAMUSCULAR; INTRAVENOUS PRN
Status: DISCONTINUED | OUTPATIENT
Start: 2021-06-28 | End: 2021-06-28 | Stop reason: SDUPTHER

## 2021-06-28 RX ORDER — PYRIDOXINE HCL (VITAMIN B6) 25 MG
50 TABLET ORAL DAILY
Status: DISCONTINUED | OUTPATIENT
Start: 2021-06-28 | End: 2021-07-01 | Stop reason: HOSPADM

## 2021-06-28 RX ORDER — DIPHENHYDRAMINE HYDROCHLORIDE 50 MG/ML
12.5 INJECTION INTRAMUSCULAR; INTRAVENOUS
Status: DISCONTINUED | OUTPATIENT
Start: 2021-06-28 | End: 2021-06-28

## 2021-06-28 RX ORDER — ENALAPRILAT 2.5 MG/2ML
1.25 INJECTION INTRAVENOUS
Status: DISCONTINUED | OUTPATIENT
Start: 2021-06-28 | End: 2021-06-28

## 2021-06-28 RX ADMIN — CEFAZOLIN SODIUM 1 G: 1 INJECTION, SOLUTION INTRAVENOUS at 14:42

## 2021-06-28 RX ADMIN — ROCURONIUM BROMIDE 10 MG: 10 INJECTION, SOLUTION INTRAVENOUS at 14:35

## 2021-06-28 RX ADMIN — CARBIDOPA AND LEVODOPA 2 TABLET: 25; 100 TABLET ORAL at 13:43

## 2021-06-28 RX ADMIN — HYDROMORPHONE HYDROCHLORIDE 0.5 MG: 1 INJECTION, SOLUTION INTRAMUSCULAR; INTRAVENOUS; SUBCUTANEOUS at 16:34

## 2021-06-28 RX ADMIN — CARBIDOPA AND LEVODOPA 2 TABLET: 25; 100 TABLET ORAL at 22:35

## 2021-06-28 RX ADMIN — HYDROMORPHONE HYDROCHLORIDE 0.5 MG: 1 INJECTION, SOLUTION INTRAMUSCULAR; INTRAVENOUS; SUBCUTANEOUS at 16:45

## 2021-06-28 RX ADMIN — SUGAMMADEX 200 MG: 100 INJECTION, SOLUTION INTRAVENOUS at 15:39

## 2021-06-28 RX ADMIN — SODIUM CHLORIDE: 9 INJECTION, SOLUTION INTRAVENOUS at 19:30

## 2021-06-28 RX ADMIN — SODIUM CHLORIDE, SODIUM LACTATE, POTASSIUM CHLORIDE, AND CALCIUM CHLORIDE: 600; 310; 30; 20 INJECTION, SOLUTION INTRAVENOUS at 14:27

## 2021-06-28 RX ADMIN — Medication 4 MG: at 07:25

## 2021-06-28 RX ADMIN — MORPHINE SULFATE 2 MG: 4 INJECTION, SOLUTION INTRAMUSCULAR; INTRAVENOUS at 11:29

## 2021-06-28 RX ADMIN — LIDOCAINE HYDROCHLORIDE 50 MG: 10 INJECTION, SOLUTION EPIDURAL; INFILTRATION; INTRACAUDAL; PERINEURAL at 14:35

## 2021-06-28 RX ADMIN — Medication 2000 MG: at 22:37

## 2021-06-28 RX ADMIN — ONDANSETRON HYDROCHLORIDE 4 MG: 2 INJECTION, SOLUTION INTRAMUSCULAR; INTRAVENOUS at 15:34

## 2021-06-28 RX ADMIN — SODIUM CHLORIDE: 9 INJECTION, SOLUTION INTRAVENOUS at 03:17

## 2021-06-28 RX ADMIN — FENTANYL CITRATE 50 MCG: 50 INJECTION, SOLUTION INTRAMUSCULAR; INTRAVENOUS at 15:03

## 2021-06-28 RX ADMIN — MORPHINE SULFATE 2 MG: 4 INJECTION, SOLUTION INTRAMUSCULAR; INTRAVENOUS at 22:36

## 2021-06-28 RX ADMIN — SUCCINYLCHOLINE CHLORIDE 100 MG: 20 INJECTION, SOLUTION INTRAMUSCULAR; INTRAVENOUS at 14:35

## 2021-06-28 RX ADMIN — FENTANYL CITRATE 50 MCG: 50 INJECTION, SOLUTION INTRAMUSCULAR; INTRAVENOUS at 14:35

## 2021-06-28 RX ADMIN — SODIUM CHLORIDE, PRESERVATIVE FREE 10 ML: 5 INJECTION INTRAVENOUS at 22:36

## 2021-06-28 RX ADMIN — SODIUM CHLORIDE, SODIUM LACTATE, POTASSIUM CHLORIDE, AND CALCIUM CHLORIDE: 600; 310; 30; 20 INJECTION, SOLUTION INTRAVENOUS at 14:35

## 2021-06-28 RX ADMIN — Medication 4 MG: at 03:17

## 2021-06-28 RX ADMIN — ROCURONIUM BROMIDE 30 MG: 10 INJECTION, SOLUTION INTRAVENOUS at 14:39

## 2021-06-28 ASSESSMENT — LIFESTYLE VARIABLES: SMOKING_STATUS: 0

## 2021-06-28 ASSESSMENT — PAIN SCALES - GENERAL
PAINLEVEL_OUTOF10: 6
PAINLEVEL_OUTOF10: 10
PAINLEVEL_OUTOF10: 0
PAINLEVEL_OUTOF10: 8
PAINLEVEL_OUTOF10: 0
PAINLEVEL_OUTOF10: 4
PAINLEVEL_OUTOF10: 5
PAINLEVEL_OUTOF10: 8

## 2021-06-28 ASSESSMENT — PAIN DESCRIPTION - LOCATION: LOCATION: HIP

## 2021-06-28 ASSESSMENT — ENCOUNTER SYMPTOMS: SHORTNESS OF BREATH: 0

## 2021-06-28 ASSESSMENT — PAIN DESCRIPTION - PAIN TYPE: TYPE: ACUTE PAIN

## 2021-06-28 ASSESSMENT — PAIN DESCRIPTION - ORIENTATION: ORIENTATION: LEFT

## 2021-06-28 NOTE — PROGRESS NOTES
Daily Progress Note    Date:2021  Patient: Sarita Albarran  : 1940  J  CODE:Full Code No additional code details  PCP:Augustin Loredo    Admit Date: 2021  8:10 AM   LOS: 1 day       Subjective:   No acute events overnight. Patient n.p.o. for planned surgical repair of fracture. Pain controlled on current regimen. Hospital course: 67-year-old female with Parkinson's disease followed by neurology (Dr. Antonio Navarro) on [facility], presented as transfer from outside facility after she sustained a fall at home, landed on her left side with pain and inability to bear weight, with plain films showing left intertrochanteric hip fracture. Orthopedic surgery consulted for surgical repair  s/p CMN. PT/OT postoperatively. Review of Systems    Comprehensive ROS completed and is negative except as otherwise noted      Objective:      Vital signs in last 24 hours:  Patient Vitals for the past 24 hrs:   BP Temp Temp src Pulse Resp SpO2 Height Weight   21 0705 (!) 149/82 97.3 °F (36.3 °C) -- 86 18 96 % -- --   21 0318 (!) 141/68 97.2 °F (36.2 °C) Temporal 98 18 94 % -- --   21 0202 -- -- -- -- -- -- 5' 4\" (1.626 m) 146 lb (66.2 kg)   21 2130 -- -- -- 88 -- -- -- --   21 1902 (!) 148/86 97.6 °F (36.4 °C) Temporal 89 16 96 % -- --   21 1500 (!) 140/80 97.2 °F (36.2 °C) Temporal 172 16 94 % -- --   21 1036 134/82 97.3 °F (36.3 °C) Temporal 92 16 97 % -- --       Physical exam    CONSTITUTIONAL: no distress  NECK: Trachea is midline. Supple, nontender  Head: Normocephalic, atraumatic  LUNGS: Normal respiratory effort, no intercostal retractions or accessory muscle use. Clear to auscultation bilaterally with no crackles, wheezes or rales  CARDIOVASCULAR: Normal rate, regular rhythm, pulses equal  GI/ABDOMEN: Soft, non-tender, non-distended, no guarding or rebound. Bowel sounds are normal.   SKIN: Warm and dry.   No rashes  NEUROLOGICAL: Tremor at baseline, mild cogwheel rigidity, alert, follows commands, speech fluent  EXTREMITIES: Limited ROM and tenderness of left hip           Lab Review   Recent Results (from the past 24 hour(s))   Basic Metabolic Panel w/ Reflex to MG    Collection Time: 06/28/21  4:58 AM   Result Value Ref Range    Sodium 139 136 - 145 mmol/L    Potassium reflex Magnesium 4.4 3.5 - 5.0 mmol/L    Chloride 103 98 - 111 mmol/L    CO2 26 22 - 29 mmol/L    Anion Gap 10 7 - 19 mmol/L    Glucose 96 74 - 109 mg/dL    BUN 15 8 - 23 mg/dL    CREATININE 0.5 0.5 - 0.9 mg/dL    GFR Non-African American >60 >60    GFR African American >59 >59    Calcium 8.0 (L) 8.8 - 10.2 mg/dL   CBC    Collection Time: 06/28/21  4:58 AM   Result Value Ref Range    WBC 8.3 4.8 - 10.8 K/uL    RBC 3.52 (L) 4.20 - 5.40 M/uL    Hemoglobin 10.6 (L) 12.0 - 16.0 g/dL    Hematocrit 33.3 (L) 37.0 - 47.0 %    MCV 94.6 81.0 - 99.0 fL    MCH 30.1 27.0 - 31.0 pg    MCHC 31.8 (L) 33.0 - 37.0 g/dL    RDW 13.7 11.5 - 14.5 %    Platelets 149 479 - 894 K/uL    MPV 10.1 9.4 - 12.3 fL       I/O last 3 completed shifts:   In: 3514 [P.O.:540; I.V.:1201]  Out: 800 [Urine:800]  I/O this shift:  In: -   Out: 350 [Urine:350]      Current Facility-Administered Medications:     meperidine (DEMEROL) injection 12.5 mg, 12.5 mg, Intravenous, Q5 Min PRN, Vanita Pendleton, APRN - CRNA    HYDROmorphone HCl PF (DILAUDID) injection 0.25 mg, 0.25 mg, Intravenous, Q5 Min PRN, Schuyler Helder, APRN - CRNA    HYDROmorphone HCl PF (DILAUDID) injection 0.5 mg, 0.5 mg, Intravenous, Q5 Min PRN, Vanita Pendleton, APRN - CRNA    morphine injection 2 mg, 2 mg, Intravenous, Q5 Min PRN, Vanita Pendleton APRN - CRNA    morphine injection 4 mg, 4 mg, Intravenous, Q5 Min PRN, JUNAID Anderson - CRNA    promethazine (PHENERGAN) injection 6.25 mg, 6.25 mg, Intravenous, Once PRN, JUNAID Anderson - CRNA    metoclopramide (REGLAN) injection 10 mg, 10 mg, Intravenous, Once PRN, JUNAID Anderson - CRNA    diphenhydrAMINE (BENADRYL) injection 12.5 mg, 12.5 mg, Intravenous, Once PRN, JUNAID Torres - CRNA    labetalol (NORMODYNE;TRANDATE) injection 5 mg, 5 mg, Intravenous, Q10 Min PRN, Toshia Thomas APRN - CRNA    hydrALAZINE (APRESOLINE) injection 5 mg, 5 mg, Intravenous, Q10 Min PRN, Toshia Thomas APRN - CRNA    enalaprilat (VASOTEC) injection 1.25 mg, 1.25 mg, Intravenous, Once PRN, Toshia Thmoas APRN - CRNA    naloxone [facility]) injection 0.4 mg, 0.4 mg, Intravenous, PRN, Ryann Polanco MD    sodium chloride flush 0.9 % injection 5-40 mL, 5-40 mL, Intravenous, 2 times per day, Ryann Polanco MD, 10 mL at 06/27/21 2215    sodium chloride flush 0.9 % injection 5-40 mL, 5-40 mL, Intravenous, PRN, Ryann Polanco MD    0.9 % sodium chloride infusion, 25 mL, Intravenous, PRN, MD Carri Quintanilla  [Held by provider] enoxaparin (LOVENOX) injection 40 mg, 40 mg, Subcutaneous, Daily, Ryann Polanco MD    ondansetron (ZOFRAN-ODT) disintegrating tablet 4 mg, 4 mg, Oral, Q8H PRN **OR** ondansetron (ZOFRAN) injection 4 mg, 4 mg, Intravenous, Q6H PRN, Ryann Polanco MD    polyethylene glycol [facility]) packet 17 g, 17 g, Oral, Daily PRN, Ryann Polanco MD    melatonin disintegrating tablet 5 mg, 5 mg, Oral, Nightly PRN, Ryann Polanco MD, 5 mg at 06/27/21 2215    morphine injection 2 mg, 2 mg, Intravenous, Q4H PRN, Henna Kumar MD    morphine injection 4 mg, 4 mg, Intravenous, Q4H PRN, Henna Kumar MD, 4 mg at 06/28/21 0725    0.9 % sodium chloride infusion, , Intravenous, Continuous, Henna Kumar MD, Last Rate: 100 mL/hr at 06/28/21 0317, New Bag at 06/28/21 0317    carbidopa-levodopa (SINEMET)  MG per tablet 2 tablet, 2 tablet, Oral, TID, Henna Kumar MD, 2 tablet at 06/27/21 6730            Assessment/plan  Principal Problem:    Closed left hip fracture, initial encounter [facility])  Active Problems:    Parkinson's disease ([facility]  Resolved Problems:    * No resolved hospital problems.  *    Left intertrochanteric hip fracture  -s/p CMN 6/28  -Monitor postoperatively  -Pain control  -Supportive care  -PT/OT     Parkinson's disease  -Continue home Sinemet    Vitamin D deficiency  -Start vitamin D replacement    Monitor labs, monitor for any postoperative acute blood loss anemia     DVT prophylaxis per orthopedic surgery      Andrew Cano MD 6/28/2021 9:52 AM

## 2021-06-28 NOTE — ANESTHESIA PRE PROCEDURE
Department of Anesthesiology  Preprocedure Note       Name:  Filipe Black   Age:  79 y.o.  :  1940                                          MRN:  [id number]         Date:  2021      Surgeon: Naila Ocampo):  Leobardo Alexandra MD    Procedure: Procedure(s):  SHORT TFN    Medications prior to admission:   Prior to Admission medications    Medication Sig Start Date End Date Taking?  Authorizing Provider   Multiple Vitamins-Minerals (THERAPEUTIC MULTIVITAMIN-MINERALS) tablet Take 1 tablet by mouth daily   Yes Historical Provider, MD   vitamin B-6 (PYRIDOXINE) 50 MG tablet Take 50 mg by mouth daily   Yes Historical Provider, MD   carbidopa-levodopa (SINEMET)  MG per tablet TAKE 2 TABLETS BY MOUTH 3 TIMES A DAY 19   Woodrow Rubio MD       Current medications:    Current Facility-Administered Medications   Medication Dose Route Frequency Provider Last Rate Last Admin    [MAR Hold] meperidine (DEMEROL) injection 12.5 mg  12.5 mg Intravenous Q5 Min PRN Robyne Formica, [facility] Hold] HYDROmorphone HCl PF (DILAUDID) injection 0.25 mg  0.25 mg Intravenous Q5 Min PRN [facility] Hold] HYDROmorphone HCl PF (DILAUDID) injection 0.5 mg  0.5 mg Intravenous Q5 Min PRN Robyne Formica, [facility] Hold] morphine injection 2 mg  2 mg Intravenous Q5 Min PRN Robyne Formica, [facility] Hold] morphine injection 4 mg  4 mg Intravenous Q5 Min PRN Robyne Formica, [facility] Hold] promethazine (PHENERGAN) injection 6.25 mg  6.25 mg Intravenous Once PRN [facility] Hold] metoclopramide (REGLAN) injection 10 mg  10 mg Intravenous Once PRN [facility] Hold] diphenhydrAMINE (BENADRYL) injection 12.5 mg  12.5 mg Intravenous Once PRN Robyne Formica, APRN - CRNA        [facility] Hold] labetalol (NORMODYNE;TRANDATE) injection 5 mg  5 mg Intravenous Q10 Min PRN JUNAID Gramajo CRNA        [facility] Hold] hydrALAZINE (APRESOLINE) injection 5 mg  5 mg Intravenous Q10 Min PRN Areli Bridget, APRN - CRNA        [facility] Hold] enalaprilat (VASOTEC) injection 1.25 mg  1.25 mg Intravenous Once PRN Areli Bridget, APRN - CRNA        [facility] Hold] vitamin D (ERGOCALCIFEROL) capsule 50,000 Units  50,000 Units Oral Weekly David Wood MD        [facility] Hold] naloxone [facility]) injection 0.4 mg  0.4 mg Intravenous PRN Berny Rodriguez MD        [facility] Hold] sodium chloride flush 0.9 % injection 5-40 mL  5-40 mL Intravenous 2 times per day Berny Rodriguez MD   10 mL at 06/27/21 2215    [MAR Hold] sodium chloride flush 0.9 % injection 5-40 mL  5-40 mL Intravenous PRN Berny Rodriguez MD        [facility] Hold] 0.9 % sodium chloride infusion  25 mL Intravenous PRN Breny Rodriguez MD        [Held by provider] enoxaparin (LOVENOX) injection 40 mg  40 mg Subcutaneous Daily Berny Rodriguez MD        [facility] Hold] ondansetron (ZOFRAN-ODT) disintegrating tablet 4 mg  4 mg Oral Q8H PRN Berny Rodriguez MD        Or   Malvin Garcia [facility] Hold] [facility]etron [facility]) injection 4 mg  4 mg Intravenous Q6H PRN Berny Rodriguez MD        [facility] Hold] polyethylene glycol (GLYCOLAX) packet 17 g  17 g Oral Daily PRN Berny Rodriguez MD        [facility] Hold] melatonin disintegrating tablet 5 mg  5 mg Oral Nightly PRN Berny Rodriguez MD   5 mg at 06/27/21 2215    [MAR Hold] morphine injection 2 mg  2 mg Intravenous Q4H PRN David Wood MD   2 mg at 06/28/21 1129    [MAR Hold] morphine injection 4 mg  4 mg Intravenous Q4H PRN David Wood MD   4 mg at 06/28/21 0725    [MAR Hold] 0.9 % sodium chloride infusion   Intravenous Continuous David Wood  mL/hr at 06/28/21 0317 New Bag at 06/28/21 0317    [MAR Hold] carbidopa-levodopa (SINEMET)  MG per tablet 2 tablet  2 tablet Oral TID David Wood MD   2 tablet at 06/27/21 4363       Allergies:  No Known Allergies    Problem List:    Patient Active Problem List   Diagnosis Code    [facility] G[facility]    Tremor R25.1    Gait abnormality R26.9    [facility] S72.002A       Past Medical History:        Diagnosis Date    Parkinson's disease ([facility]        Past Surgical History:        Procedure Laterality Date    APPENDECTOMY      CHOLECYSTECTOMY      HYSTERECTOMY         Social History:    Social History     Tobacco Use    Smoking status: Never Smoker    Smokeless tobacco: Never Used   Substance Use Topics    Alcohol use: Yes     Comment: occas                                Counseling given: Not Answered      Vital Signs (Current):   Vitals:    06/28/21 0202 06/28/21 0318 06/28/21 0705 06/28/21 1126   BP:  (!) 141/68 (!) 149/82 (!) 140/80   Pulse:  98 86 87   Resp:  18 18 18   Temp:  97.2 °F (36.2 °C) 97.3 °F (36.3 °C) 97.2 °F (36.2 °C)   TempSrc:  Temporal  Temporal   SpO2:  94% 96% 97%   Weight: 146 lb (66.2 kg)      Height: 5' 4\" (1.626 m)                                                 BP Readings from Last 3 Encounters:   06/28/21 (!) 140/80   02/26/19 115/76   08/23/18 134/81       NPO Status: Time of last liquid consumption: 0000                        Time of last solid consumption: 0000                        Date of last liquid consumption: 06/27/21                        Date of last solid food consumption: 06/27/21    BMI:   Wt Readings from Last 3 Encounters:   06/28/21 146 lb (66.2 kg)   02/26/19 149 lb (67.6 kg)   08/23/18 152 lb 2 oz (69 kg)     Body mass index is 25.06 kg/m².     CBC:   Lab Results   Component Value Date    WBC 8.3 06/28/2021    RBC 3.52 06/28/2021    HGB 10.6 06/28/2021    HCT 33.3 06/28/2021    MCV 94.6 06/28/2021    RDW 13.7 06/28/2021     06/28/2021       CMP:   Lab Results   Component Value Date     06/28/2021    K 4.4 06/28/2021     06/28/2021    CO2 26 06/28/2021    BUN 15 06/28/2021    CREATININE 0.5 06/28/2021    GFRAA >59 06/28/2021    LABGLOM >60 06/28/2021    GLUCOSE 96 06/28/2021    PROT 7.1 06/27/2021    CALCIUM 8.0 06/28/2021    BILITOT 0.7 06/27/2021    ALKPHOS 68 06/27/2021    AST 20 06/27/2021    ALT 9 06/27/2021       POC Tests: No results for input(s): POCGLU, POCNA, POCK, POCCL, POCBUN, POCHEMO, POCHCT in the last 72 hours. Coags:   Lab Results   Component Value Date    PROTIME 13.2 06/27/2021    INR 0.98 06/27/2021    APTT 24.5 06/27/2021       HCG (If Applicable): No results found for: PREGTESTUR, PREGSERUM, HCG, HCGQUANT     ABGs: No results found for: PHART, PO2ART, USA6RQM, IJP1PST, BEART, E5OQZUGT     Type & Screen (If Applicable):  No results found for: LABABO, LABRH    Drug/Infectious Status (If Applicable):  No results found for: HIV, HEPCAB    COVID-19 Screening (If Applicable): No results found for: COVID19        Anesthesia Evaluation  Patient summary reviewed and Nursing notes reviewed no history of anesthetic complications:   Airway: Mallampati: IV  TM distance: >3 FB   Neck ROM: limited  Mouth opening: < 3 FB Dental: normal exam         Pulmonary:       (-) shortness of breath, sleep apnea and not a current smoker                           Cardiovascular:  Exercise tolerance: good (>4 METS),   (+) hyperlipidemia    (-) pacemaker, hypertension, past MI, CAD, CABG/stent, dysrhythmias and  angina    ECG reviewed               Beta Blocker:  Not on Beta Blocker         Neuro/Psych:   (+) neuromuscular disease: Parkinson's disease,    (-) seizures and CVA           GI/Hepatic/Renal:        (-) GERD, liver disease and no renal disease       Endo/Other:    (+) blood dyscrasia (anemia)::., electrolyte abnormalities, no malignancy/cancer. (-) diabetes mellitus, no malignancy/cancer               Abdominal:             Vascular:     - DVT and PE. Other Findings: Resting tremor          Anesthesia Plan      general     ASA 2     (Home dose of sinemet and ekg prior to OR. Thanks.)  Induction: intravenous. MIPS: Postoperative opioids intended and Prophylactic antiemetics administered.   Anesthetic plan and risks discussed with patient. Use of blood products discussed with patient whom consented to blood products.                  Tamera Tellez DO   6/28/2021

## 2021-06-28 NOTE — OP NOTE
Operative Note      Patient: Hanna Mccabe  YOB: 1940  MRN: [id number]    Date of Procedure: 6/28/2021    OPERATIVE NOTE    PREOPERATIVE DIAGNOSIS: LEFT INTERTROCHANTERIC HIP FRACTURE     POSTOPERATIVE DIAGNOSIS:  LEFT INTERTROCHANTERIC HIP FRACTURE    PROCEDURE:  Procedure(s): CEPHALOMEDULLARY NAILING OF A LEFT  INTERTROCHANTERIC HIP FRACTURE      SURGEON:  Randy Rincon MD    ASSISTANT: Izabella Gaviria    ANESTHESIA:  General    ESTIMATED BLOOD LOSS:  Minimal.    COMPLICATIONS:  None. CONDITION:  Stable. IMPLANTS:  Synthes Short  Size 11 TFN    BRIEF HISTORY: This is a 71-year-old female who presented to the Emergency  Department where x-rays demonstrated an intertrochanteric femur fracture after a fall onto the hip. Based on this, decision made to take the patient to the operating room for a  cephalomedullary nailing.     DESCRIPTION OF PROCEDURE: The patient was interviewed in the preanesthesia  area where the left hip was marked with a marking pen. The patient was then  taken to the operative suite where general endotracheal anesthesia was  performed by the anesthesia team. A timeout was then called, confirming the  patient, the operative site as well as the planned procedure and the  administration of antibiotics. The patient was then positioned on a standard  fracture table with the non-operative limb placed in a well-padded well leg  rodriguez, taking care to pad the peroneal nerve. The operative limb was placed  in a well-padded traction boot, and she was placed over a padded perineal  post. Initially, gentle traction was placed across the limb and care was  taken to make sure that the patella was facing directly anterior.    Intraoperative fluoroscopy was then obtained to confirm our reduction. The  patient was then prepped and draped in a standard sterile fashion using  ChloraPrep.     Fluoroscopy was brought in to localize the tip of the trochanter.  An incision  was then made from the tip of the trochanter approximately 5 cm proximally.    Careful dissection was then carried down through the subcutaneous tissue and  the glutofemoral fascia down to the tip of the trochanter. A guidepin was  then placed at the tip of the trochanter and advanced down the femur. This  was checked on the AP and lateral views to be certain it was at the tip of the  trochanter on the center of the canal on the lateral view. We then brought  the Synthes entry reamer in to enter the femoral canal.     A size 11 short Synthes TFN nail was then placed down the shaft of the femur.    The outrigger guide was placed and a small incision was made laterally and  careful dissection was carried down to the lateral cortex of the femur. The  outrigger sleeve was then placed against the lateral cortex of the femur  through our incision. A guidepin was then placed into the center position of the  femoral head on the  AP and  lateral view. This measured for an 24CC helical blade. The outer cortex was drilled and we then reamed 5 mm short of our measured   distance into the femoral head. We then placed the 80 mm helical blade in  a standard fashion into the subchondral bone approximately 5 mm from the tip  of the subchondral bone on the AP and lateral views in the center of the  femoral head. The nail was then locked proximally.       Our attention was then turned distally to the interlocking screw. A standard  34 mm bicortical interlocking screw was placed through the sleeve of the outrigger  guide. The outrigger was then removed. AP and lateral views confirmed an  appropriate reduction of the fracture with appropriate placement of hardware.     Wound was then copiously irrigated with bulb syringe lavage. The deep tissue  was approximated with Vicryl sutures. Skin was closed with staples. The  patient was placed in a sterile dressing. She awoke from anesthesia without  difficulty and transferred to the PACU in stable condition.  All

## 2021-06-29 PROBLEM — D64.9 POSTOPERATIVE ANEMIA: Status: ACTIVE | Noted: 2021-06-29

## 2021-06-29 LAB
ANION GAP SERPL CALCULATED.3IONS-SCNC: 8 MMOL/L (ref 7–19)
BUN BLDV-MCNC: 11 MG/DL (ref 8–23)
CALCIUM SERPL-MCNC: 7.8 MG/DL (ref 8.8–10.2)
CHLORIDE BLD-SCNC: 104 MMOL/L (ref 98–111)
CO2: 26 MMOL/L (ref 22–29)
CREAT SERPL-MCNC: 0.5 MG/DL (ref 0.5–0.9)
GFR AFRICAN AMERICAN: >59
GFR NON-AFRICAN AMERICAN: >60
GLUCOSE BLD-MCNC: 100 MG/DL (ref 74–109)
HCT VFR BLD CALC: 28.7 % (ref 37–47)
HEMOGLOBIN: 9.3 G/DL (ref 12–16)
MCH RBC QN AUTO: 30.5 PG (ref 27–31)
MCHC RBC AUTO-ENTMCNC: 32.4 G/DL (ref 33–37)
MCV RBC AUTO: 94.1 FL (ref 81–99)
PDW BLD-RTO: 13.5 % (ref 11.5–14.5)
PLATELET # BLD: 179 K/UL (ref 130–400)
PMV BLD AUTO: 10.3 FL (ref 9.4–12.3)
POTASSIUM REFLEX MAGNESIUM: 4.5 MMOL/L (ref 3.5–5)
RBC # BLD: 3.05 M/UL (ref 4.2–5.4)
SODIUM BLD-SCNC: 138 MMOL/L (ref 136–145)
WBC # BLD: 9.9 K/UL (ref 4.8–10.8)

## 2021-06-29 PROCEDURE — 97530 THERAPEUTIC ACTIVITIES: CPT

## 2021-06-29 PROCEDURE — 6370000000 HC RX 637 (ALT 250 FOR IP): Performed by: ORTHOPAEDIC SURGERY

## 2021-06-29 PROCEDURE — 2700000000 HC OXYGEN THERAPY PER DAY

## 2021-06-29 PROCEDURE — 6370000000 HC RX 637 (ALT 250 FOR IP): Performed by: STUDENT IN AN ORGANIZED HEALTH CARE EDUCATION/TRAINING PROGRAM

## 2021-06-29 PROCEDURE — 6360000002 HC RX W HCPCS: Performed by: ORTHOPAEDIC SURGERY

## 2021-06-29 PROCEDURE — 92522 EVALUATE SPEECH PRODUCTION: CPT

## 2021-06-29 PROCEDURE — 92610 EVALUATE SWALLOWING FUNCTION: CPT

## 2021-06-29 PROCEDURE — 85027 COMPLETE CBC AUTOMATED: CPT

## 2021-06-29 PROCEDURE — 80048 BASIC METABOLIC PNL TOTAL CA: CPT

## 2021-06-29 PROCEDURE — 2580000003 HC RX 258: Performed by: ORTHOPAEDIC SURGERY

## 2021-06-29 PROCEDURE — 1210000000 HC MED SURG R&B

## 2021-06-29 PROCEDURE — 97535 SELF CARE MNGMENT TRAINING: CPT

## 2021-06-29 PROCEDURE — 97161 PT EVAL LOW COMPLEX 20 MIN: CPT

## 2021-06-29 PROCEDURE — 36415 COLL VENOUS BLD VENIPUNCTURE: CPT

## 2021-06-29 PROCEDURE — 97165 OT EVAL LOW COMPLEX 30 MIN: CPT

## 2021-06-29 RX ORDER — OXYCODONE HYDROCHLORIDE 5 MG/1
5 TABLET ORAL EVERY 4 HOURS PRN
Status: DISCONTINUED | OUTPATIENT
Start: 2021-06-29 | End: 2021-06-29

## 2021-06-29 RX ORDER — OXYCODONE HYDROCHLORIDE AND ACETAMINOPHEN 5; 325 MG/1; MG/1
1 TABLET ORAL EVERY 4 HOURS PRN
Status: DISCONTINUED | OUTPATIENT
Start: 2021-06-29 | End: 2021-07-01 | Stop reason: HOSPADM

## 2021-06-29 RX ORDER — OXYCODONE HYDROCHLORIDE AND ACETAMINOPHEN 5; 325 MG/1; MG/1
1 TABLET ORAL EVERY 4 HOURS PRN
Status: DISCONTINUED | OUTPATIENT
Start: 2021-06-29 | End: 2021-06-29

## 2021-06-29 RX ADMIN — MORPHINE SULFATE 2 MG: 4 INJECTION, SOLUTION INTRAMUSCULAR; INTRAVENOUS at 04:53

## 2021-06-29 RX ADMIN — ERGOCALCIFEROL 50000 UNITS: 1.25 CAPSULE ORAL at 08:52

## 2021-06-29 RX ADMIN — OXYCODONE HYDROCHLORIDE AND ACETAMINOPHEN 1 TABLET: 5; 325 TABLET ORAL at 15:12

## 2021-06-29 RX ADMIN — OXYCODONE HYDROCHLORIDE AND ACETAMINOPHEN 1 TABLET: 5; 325 TABLET ORAL at 21:18

## 2021-06-29 RX ADMIN — Medication 50 MG: at 08:50

## 2021-06-29 RX ADMIN — SODIUM CHLORIDE: 9 INJECTION, SOLUTION INTRAVENOUS at 06:16

## 2021-06-29 RX ADMIN — CARBIDOPA AND LEVODOPA 2 TABLET: 25; 100 TABLET ORAL at 20:54

## 2021-06-29 RX ADMIN — OXYCODONE HYDROCHLORIDE AND ACETAMINOPHEN 1 TABLET: 5; 325 TABLET ORAL at 08:50

## 2021-06-29 RX ADMIN — MULTIPLE VITAMINS W/ MINERALS TAB 1 TABLET: TAB at 08:51

## 2021-06-29 RX ADMIN — CARBIDOPA AND LEVODOPA 2 TABLET: 25; 100 TABLET ORAL at 15:11

## 2021-06-29 RX ADMIN — ENOXAPARIN SODIUM 30 MG: 30 INJECTION SUBCUTANEOUS at 08:52

## 2021-06-29 RX ADMIN — Medication 2000 MG: at 06:15

## 2021-06-29 RX ADMIN — CARBIDOPA AND LEVODOPA 2 TABLET: 25; 100 TABLET ORAL at 08:49

## 2021-06-29 ASSESSMENT — PAIN SCALES - GENERAL
PAINLEVEL_OUTOF10: 9
PAINLEVEL_OUTOF10: 6
PAINLEVEL_OUTOF10: 6
PAINLEVEL_OUTOF10: 1
PAINLEVEL_OUTOF10: 1
PAINLEVEL_OUTOF10: 4
PAINLEVEL_OUTOF10: 6

## 2021-06-29 ASSESSMENT — PAIN DESCRIPTION - PAIN TYPE
TYPE: SURGICAL PAIN
TYPE: ACUTE PAIN
TYPE: ACUTE PAIN

## 2021-06-29 ASSESSMENT — PAIN - FUNCTIONAL ASSESSMENT
PAIN_FUNCTIONAL_ASSESSMENT: PREVENTS OR INTERFERES SOME ACTIVE ACTIVITIES AND ADLS
PAIN_FUNCTIONAL_ASSESSMENT: PREVENTS OR INTERFERES WITH MANY ACTIVE NOT PASSIVE ACTIVITIES
PAIN_FUNCTIONAL_ASSESSMENT: PREVENTS OR INTERFERES WITH MANY ACTIVE NOT PASSIVE ACTIVITIES

## 2021-06-29 ASSESSMENT — PAIN DESCRIPTION - FREQUENCY
FREQUENCY: INTERMITTENT
FREQUENCY: CONTINUOUS
FREQUENCY: INTERMITTENT

## 2021-06-29 ASSESSMENT — PAIN DESCRIPTION - ORIENTATION
ORIENTATION: LEFT
ORIENTATION: LEFT

## 2021-06-29 ASSESSMENT — PAIN DESCRIPTION - LOCATION
LOCATION: HIP
LOCATION: HIP

## 2021-06-29 ASSESSMENT — PAIN DESCRIPTION - DESCRIPTORS
DESCRIPTORS: ACHING
DESCRIPTORS: DISCOMFORT
DESCRIPTORS: ACHING

## 2021-06-29 NOTE — PROGRESS NOTES
Daily Progress Note    Date:2021  Patient: Christelle Leo  : 1940  ZVR:[id number]  CODE:Full Code No additional code details  PCP:Thomas Meryle Dess    Admit Date: 2021  8:10 AM   LOS: 2 days       Subjective:   No acute events overnight. Pain controlled on current regimen. Hospital course: 71-year-old female with Parkinson's disease followed by neurology (Dr. Christophe Hillman) on Sinemet, presented as transfer from outside facility after she sustained a fall at home, landed on her left side with pain and inability to bear weight, with plain films showing left intertrochanteric hip fracture. Orthopedic surgery consulted for surgical repair  s/p CMN. Post-op anemia noted, but stable. PT/OT postoperatively. Working on SNF placement.       Review of Systems    Comprehensive ROS completed and is negative except as otherwise noted      Objective:      Vital signs in last 24 hours:  Patient Vitals for the past 24 hrs:   BP Temp Temp src Pulse Resp SpO2 Weight   21 0845 104/66 -- -- -- -- -- --   21 0636 (!) 144/75 97.1 °F (36.2 °C) Temporal 94 18 94 % 152 lb 1.6 oz (69 kg)   21 0438 -- -- -- -- -- -- 152 lb 8 oz (69.2 kg)   21 0253 114/74 97.4 °F (36.3 °C) Temporal 98 16 96 % --   21 0007 121/76 96.7 °F (35.9 °C) Temporal 107 16 94 % --   21 2203 129/76 97.3 °F (36.3 °C) Temporal 109 16 95 % --   21 126/80 97.3 °F (36.3 °C) Temporal 105 16 94 % --   21 1916 123/77 97.2 °F (36.2 °C) Temporal 105 16 95 % --   21 1800 132/76 97.2 °F (36.2 °C) Temporal 107 16 94 % --   21 1744 127/74 97.3 °F (36.3 °C) Temporal 107 16 95 % --   21 1711 (!) 140/76 97.6 °F (36.4 °C) Temporal 102 14 98 % --   21 1700 (!) 141/70 -- -- 102 14 100 % --   21 1655 136/72 97.4 °F (36.3 °C) Temporal 101 17 100 % --   21 1650 135/87 -- -- 107 18 -- --   21 1645 (!) 132/99 -- -- 102 20 -- --   21 1640 (!) 116/34 -- -- 104 25 -- -- 06/28/21 1635 (!) 142/95 -- -- 105 21 100 % --   06/28/21 1630 (!) 147/80 -- -- 104 13 100 % --   06/28/21 1625 (!) 145/75 -- -- 99 18 100 % --   06/28/21 1620 (!) 129/103 -- -- 103 12 100 % --   06/28/21 1615 127/75 -- -- 104 15 100 % --   06/28/21 1612 -- -- -- 99 -- -- --   06/28/21 1610 127/75 97.4 °F (36.3 °C) Temporal 99 15 90 % --   06/28/21 1609 -- 97.4 °F (36.3 °C) -- -- -- -- --   06/28/21 1126 (!) 140/80 97.2 °F (36.2 °C) Temporal 87 18 97 % --       Physical exam    CONSTITUTIONAL: no distress  NECK: Trachea is midline. Supple, nontender  Head: Normocephalic, atraumatic  LUNGS: Normal respiratory effort, no intercostal retractions or accessory muscle use. Clear to auscultation bilaterally with no crackles, wheezes or rales  CARDIOVASCULAR: Normal rate, regular rhythm, pulses equal  GI/ABDOMEN: Soft, non-tender, non-distended, no guarding or rebound. Bowel sounds are normal.   SKIN: Warm and dry.   No rashes  NEUROLOGICAL: Tremor at baseline, mild cogwheel rigidity, alert, follows commands, speech fluent  EXTREMITIES: Limited ROM and tenderness of left hip           Lab Review   Recent Results (from the past 24 hour(s))   EKG 12 Lead    Collection Time: 06/28/21  1:33 PM   Result Value Ref Range    P-R Interval 240 ms    QRS Duration 82 ms    Q-T Interval 358 ms    QTc Calculation (Bazett) 415 ms    P Axis 59 degrees    T Axis 94 degrees   Basic Metabolic Panel w/ Reflex to MG    Collection Time: 06/29/21  3:29 AM   Result Value Ref Range    Sodium 138 136 - 145 mmol/L    Potassium reflex Magnesium 4.5 3.5 - 5.0 mmol/L    Chloride 104 98 - 111 mmol/L    CO2 26 22 - 29 mmol/L    Anion Gap 8 7 - 19 mmol/L    Glucose 100 74 - 109 mg/dL    BUN 11 8 - 23 mg/dL    CREATININE 0.5 0.5 - 0.9 mg/dL    GFR Non-African American >60 >60    GFR African American >59 >59    Calcium 7.8 (L) 8.8 - 10.2 mg/dL   CBC    Collection Time: 06/29/21  3:29 AM   Result Value Ref Range    WBC 9.9 4.8 - 10.8 K/uL    RBC 3.05 (L) 4.20 - 5.40 M/uL    Hemoglobin 9.3 (L) 12.0 - 16.0 g/dL    Hematocrit 28.7 (L) 37.0 - 47.0 %    MCV 94.1 81.0 - 99.0 fL    MCH 30.5 27.0 - 31.0 pg    MCHC 32.4 (L) 33.0 - 37.0 g/dL    RDW 13.5 11.5 - 14.5 %    Platelets 461 521 - 255 K/uL    MPV 10.3 9.4 - 12.3 fL       I/O last 3 completed shifts: In: 1964.2 [I.V.:1964.2]  Out: 5055 [BMEXI:0778; Blood:100]  No intake/output data recorded.       Current Facility-Administered Medications:     oxyCODONE-acetaminophen (PERCOCET) 5-325 MG per tablet 1 tablet, 1 tablet, Oral, Q4H PRN, Ross Llamas MD    vitamin D (ERGOCALCIFEROL) capsule 50,000 Units, 50,000 Units, Oral, Weekly, Luna Jaime MD, 50,000 Units at 06/29/21 0505    therapeutic multivitamin-minerals 1 tablet, 1 tablet, Oral, Daily, Luna Jaime MD, 1 tablet at 06/29/21 0851    pyridoxine (B-6) tablet 50 mg, 50 mg, Oral, Daily, Luna Jaime MD, 50 mg at 06/29/21 0850    sodium chloride flush 0.9 % injection 5-40 mL, 5-40 mL, Intravenous, 2 times per day, Luna Jaime MD, 10 mL at 06/28/21 2236    sodium chloride flush 0.9 % injection 5-40 mL, 5-40 mL, Intravenous, PRN, Luna Jaime MD    0.9 % sodium chloride infusion, 25 mL, Intravenous, PRN, Luna Jaime MD    ondansetron (ZOFRAN-ODT) disintegrating tablet 4 mg, 4 mg, Oral, Q8H PRN **OR** ondansetron (ZOFRAN) injection 4 mg, 4 mg, Intravenous, Q6H PRN, Luna Jaime MD    0.9 % sodium chloride infusion, , Intravenous, Continuous, Luna Jaime MD, Last Rate: 125 mL/hr at 06/29/21 0616, New Bag at 06/29/21 0616    enoxaparin (LOVENOX) injection 30 mg, 30 mg, Subcutaneous, Daily, uLna Jaime MD, 30 mg at 06/29/21 5366    naloxone [facility]) injection 0.4 mg, 0.4 mg, Intravenous, PRN, Luna Jaime MD    polyethylene glycol [facility]) packet 17 g, 17 g, Oral, Daily PRN, Luna Jaime MD    melatonin disintegrating tablet 5 mg, 5 mg, Oral, Nightly PRN, Luna Jaime MD, 5 mg at 06/27/21 1701    morphine injection 2 mg, 2 mg, Intravenous, Q4H PRN, Luna Jaime MD, 2 mg at 06/29/21 0453    morphine injection 4 mg, 4 mg, Intravenous, Q4H PRN, Gi Zavala MD, 4 mg at 06/28/21 0725    0.9 % sodium chloride infusion, , Intravenous, Continuous, Gi Zavala MD, Last Rate: 100 mL/hr at 06/28/21 0317, New Bag at 06/28/21 0317    carbidopa-levodopa (SINEMET)  MG per tablet 2 tablet, 2 tablet, Oral, TID, Gi Zavala MD, 2 tablet at 06/29/21 2278            Assessment/plan  Principal Problem:    Closed left hip fracture, initial encounter [facility])  Active Problems:    Parkinson's disease ([address]    Postoperative anemia  Resolved Problems:    * No resolved hospital problems.  *    Left intertrochanteric hip fracture  -s/p CMN 6/28  -Monitor postoperatively  -Pain control  -Supportive care  -PT/OT     Post-op anemia, component of hemodilution  -monitor CBC  -transfuse for Hgb <7    Parkinson's disease  -Continue home Sinemet    Vitamin D deficiency  -Start vitamin D replacement    DVT prophylaxis Lovenox per orthopedic surgery    Working on SNF placement      Henna Kumar MD 6/29/2021 10:33 AM

## 2021-06-29 NOTE — PROGRESS NOTES
Orthopedic Surgery Progress Note    Kym Creed  6/29/2021    Subjective:     Post-Operative Day # 1 s/p left intramedullary nailing  Systemic or Specific Complaints: No Complaints    Objective:     /75   Pulse 98   Temp 97.5 °F (36.4 °C) (Temporal)   Resp 18   Ht 5' 4\" (1.626 m)   Wt 152 lb 1.6 oz (69 kg)   SpO2 91%   BMI 26.11 kg/m²     General: alert, appears stated age and cooperative   Wound: clean, dry, intact              Dressing: clean, dry, and intact   Extremity: Distal NVI            DVT Exam: No evidence of DVT seen on physical exam.                   Data Review  CBC:   Lab Results   Component Value Date    WBC 9.9 06/29/2021    RBC 3.05 06/29/2021    HGB 9.3 06/29/2021    HCT 28.7 06/29/2021     06/29/2021       Assessment:     Status Post left hip surgery, doing well     Plan:      1:  DVT prophylaxis  2:  Continue pain control  3:  Physical therapy as per protocol  4:  Anticipate discharge when medically  5: Weight bearing as tolerated    Gian Velázquez PA-C

## 2021-06-29 NOTE — PROGRESS NOTES
Physical Therapy    Facility/Department: [facility] SURG SERVICES  Initial Assessment    NAME: Jose Alberto Gar  : 1940  MRN: [id number]    Date of Service: 2021    Discharge Recommendations:  Continue to assess pending progress, Patient would benefit from continued therapy after discharge        Assessment   Body structures, Functions, Activity limitations: Decreased ADL status; Decreased ROM; Decreased strength;Decreased safe awareness;Decreased endurance;Decreased balance; Increased pain;Vestibular Impairment  Assessment: Pt was able to perform supine to sit with mod assist from therapists with movement of surgical leg. Pt remained dizzy throughout sitting up. Pt requested to lay back down. Will progress with transfers as tolerated. Treatment Diagnosis: Closed left hip fracture  Prognosis: Good  Decision Making: Low Complexity  PT Education: PT Role;Plan of Care;Precautions;Weight-bearing Education  REQUIRES PT FOLLOW UP: Yes  Activity Tolerance  Activity Tolerance: Patient limited by fatigue (Pt was very dizzy while sitting at EOB)       Patient Diagnosis(es): There were no encounter diagnoses. has a past medical history of Parkinson's disease ([facility] [address] has a past surgical history that includes Cholecystectomy; Hysterectomy; and Appendectomy. Restrictions  Restrictions/Precautions  Restrictions/Precautions: Weight Bearing, Fall Risk (LLE TTWB)  Lower Extremity Weight Bearing Restrictions  Left Lower Extremity Weight Bearing: Toe Touch Weight Bearing  Position Activity Restriction  Other position/activity restrictions: TTWB  Vision/Hearing  Vision: Within Functional Limits  Hearing: Within functional limits     Subjective  General  Chart Reviewed: Yes  Patient assessed for rehabilitation services?: Yes  Diagnosis: L SHORT TFN  Follows Commands: Within Functional Limits  Subjective  Subjective: Pt WILLING TO PARTICIPATE. C/O DIZZINESS ONCE SITTING.   Pain Screening  Patient Currently in Pain: Yes  Pain Assessment  Pain Assessment: 0-10  Pain Level: 1 (Pt started at 1, when sitting at EOB moved up to a 6/10)  Pain Type: Surgical pain  Pain Location: Hip  Pain Descriptors: Discomfort  Pain Frequency: Continuous  Functional Pain Assessment: Prevents or interferes some active activities and ADLs  Non-Pharmaceutical Pain Intervention(s): Ambulation/Increased Activity;Repositioned  Vital Signs  BP: 104/66  BP Location: Right upper arm  Patient Position: Sitting       Orientation  Orientation  Overall Orientation Status: Within Functional Limits  Social/Functional History  Social/Functional History  Lives With: Spouse  Type of Home: House  Home Layout: One level  Home Access: Stairs to enter with rails  Entrance Stairs - Number of Steps: 1  Bathroom Shower/Tub: Tub/Shower unit  ADL Assistance: Independent  Homemaking Assistance: Independent  Homemaking Responsibilities: Yes  Ambulation Assistance: Independent  Transfer Assistance: Independent  Cognition   Cognition  Overall Cognitive Status: WFL    Objective     Observation/Palpation  Posture: Fair    AROM RLE (degrees)  RLE AROM: WFL  AROM LLE (degrees)  LLE AROM : WFL  LLE General AROM: DEC OVERALL DUE TO DISCOMFORT  Strength RLE  Strength RLE: WFL  Strength LLE  Comment: <3/5        Bed mobility  Supine to Sit: Minimal assistance; Moderate assistance (needed assistance with surgical leg)  Sit to Supine: Minimal assistance; Moderate assistance  Comment: SAT EOB ~ 3-4 MIN WITH SB-CGA DUE TO LIGHTHEADEDNESS. BP TAKEN. Pt DECLINED STAND ATTEMPT.            Balance  Sitting - Static: Fair;+  Sitting - Dynamic: Fair;-        Plan   Plan  Times per week: 7  Times per day: Daily  Current Treatment Recommendations: Strengthening, ROM, Balance Training, Functional Mobility Training, Transfer Training, Safety Education & Training, Patient/Caregiver Education & Training  Safety Devices  Type of devices: Bed alarm in place, Call light within reach    G-Code       OutComes Score AM-PAC Score             Goals  Short term goals  Time Frame for Short term goals: 14 days  Short term goal 1: supine to sit modified independent  Short term goal 2: Sit to stand SBA  Short term goal 3: Bed to chair MIN       Therapy Time   Individual Concurrent Group Co-treatment   Time In           Time Out           Minutes                   Isaias Dale, PT

## 2021-06-29 NOTE — PROGRESS NOTES
Speech Language Pathology  Facility/Department: [facility] SURG SERVICES   CLINICAL BEDSIDE SWALLOW EVALUATION  SPEECH PRODUCTION EVALUATION     NAME: Margaret Feldman  :   MRN: [id number]    ADMISSION DATE: 2021  ADMITTING DIAGNOSIS: has Parkinson's disease ([facility] [address] Tremor; Gait abnormality; Closed left hip fracture, initial encounter ([facility] [address] and Postoperative anemia on their problem list.    Date of Eval: 2021  Evaluating Therapist: Sunday Landau, SLP    Current Diet level:  Regular solid consistency with thin liquids    Reason for Referral  Margaret Feldman was referred for a bedside swallow evaluation to assess the efficiency of her swallow function, identify signs and symptoms of aspiration and make recommendations regarding safe dietary consistencies, effective compensatory strategies, and safe eating environment. Impression  Assessed patient's swallowing function. Patient exhibited sluggish, mild-moderately decreased laryngeal elevation for swallow airway protection. Even so, no outward S/S penetration/aspiration was noted with any regular solid consistency trial presented during evaluation this date. Just mild delayed throat clears were observed with thin H2O trials. At this time, would recommend continuation least restrictive regular solid consistency with thin liquids. Assist in meal set-up. Recommend meds whole in pudding/applesauce. Will continue to follow. Thank you for this consult. Treatment Plan   Requires SLP Intervention: Yes     Recommended Diet and Intervention  Diet Solids Recommendation: Regular solid   Liquid Consistency Recommendation: Thin  Recommended Form of Meds: Meds whole in puree as able  Therapeutic Interventions: Patient/Family education;Diet tolerance monitoring     Compensatory Swallowing Strategies  Compensatory Swallowing Strategies: Upright as possible for all oral intake;Assist in meal set-up; Small bites/sips;Eat/Feed slowly; Alternate solids and liquids; Remain upright for 30-45 minutes after meals     Treatment/Goals  Timeframe for Short-term Goals: 1x/day for 3 days   Goal 1: Patient will tolerate regular solid consistency and thin liquids with min S/S penetration/aspiration during PO intake. Goal 2: Patient staff will follow swallow safety recommendations to decrease risk of penetration/aspiration during PO intake. General  Chart Reviewed: Yes  Behavior/Cognition: Alert; Cooperative  O2 Device: Nasal Cannula  Communication Observation: (Assessed patient's speech production. Patient exhibited adequate labial and lingual movements and SLP ranked functional intelligibility of speech for unfamiliar listeners at 100% in utterances with background noise present.)  Follows Directions: Simple   Patient Positioning: Upright in bed  Consistencies Administered: Regular solid; Thin - cup      Oral Motor Examination   Labial ROM: (Decreased, bilaterally, during labial retraction trials and labial protrusion trials; decreased more on the right.)  Labial Strength: (Adequate during labial compression trials.)  Labial Coordination: (Adequate movements were noted.)  Lingual ROM: (Adequate during lingual extension trials with full point achieved; decreased during lingual elevation trials without use of accessory jaw movement; adequate movements noted bilaterally.)  Lingual Coordination: (Adequate movements were noted.)     Assessed patient's swallowing function with the following observations noted:     Oral Phase  Oral Phase - Comment: Patient exhibited adequate oral prep of regular solid consistency trials presented independently. Oral transit of regular solid consistency primarily measured 1-2 seconds in length and min oral cavity residue was noted post swallows; residue cleared from the mouth with additional dry swallows. Oral transit of thin H2O trials, presented independently via cup, primarily measured 1-2 seconds in length.       Pharyngeal Phase  Laryngeal Elevation: (Patient exhibited sluggish, mild-moderately decreased laryngeal elevation for swallow airway protection.)  Delayed Throat Clear: Thin - cup   Pharyngeal Phase - Comment: No outward S/S penetration/aspiration was noted with any regular solid consistency trial presented during assessment this date. Just mild delayed throat clears were observed with thin H2O trials. At this time, would recommend continuation least restrictive regular solid consistency with thin liquids. Assist in meal set-up. Recommend meds whole in pudding/applesauce. Will continue to follow.     Electronically signed by ALAN Parks on 6/29/2021 at 12:10 PM

## 2021-06-29 NOTE — CARE COORDINATION
SW met with Pt regarding DC planning options. Pt requested a referral to [address] [place] [address] [facility] since it is close to her area.  SW has notified the facility of the referral. Awaiting approval/denial.  Rene Dorantes  [phone] [place]  [phone]   [phone] pharmacy fax for hard scripts  Electronically signed by August Level on 6/29/2021 at 4:02 PM

## 2021-06-29 NOTE — PROGRESS NOTES
Physical Therapy  Name: Phan Wall  MRN:  [id number]  Date of service:  6/29/2021 06/29/21 1521   Restrictions/Precautions   Restrictions/Precautions Weight Bearing; Fall Risk   Lower Extremity Weight Bearing Restrictions   Left Lower Extremity Weight Bearing Weight Bearing As Tolerated   General   Chart Reviewed Yes   Subjective   Subjective Pt ready to get up to [facility]. Pain Screening   Patient Currently in Pain Yes   Pain Assessment   Pain Assessment 0-10   Pain Level 6  (RN present with pain meds)   Pain Type Acute pain   Pain Location Hip   Pain Orientation Left   Pain Descriptors Aching   Pain Frequency Intermittent   Functional Pain Assessment Prevents or interferes with many active not passive activities   Non-Pharmaceutical Pain Intervention(s) Ambulation/Increased Activity;Repositioned; Rest   Response to Pain Intervention Patient Satisfied  (once positioned for comfort)   Bed Mobility   Supine to Sit Moderate assistance  (HOB elevated)   Sit to Supine Moderate assistance  (x2)   Scooting Maximal assistance;2 Person assistance   Transfers   Sit to Stand Moderate Assistance;Maximum Assistance;2 Person Assistance   Stand to sit Moderate Assistance;2 Person Assistance   Bed to Chair Moderate assistance;2 Person Assistance   Stand Pivot Transfers Moderate Assistance;2 Person Assistance  (stand/step with rwx)   Comment bed<>BSC, modA for standing balance d/t posterior lean, requires assist for hygiene after toileting   Ambulation   Ambulation? No   Short term goals   Time Frame for Short term goals 14 days   Short term goal 1 supine to sit modified independent   Short term goal 2 Sit to stand SBA   Short term goal 3 Bed to chair MIN   Conditions Requiring Skilled Therapeutic Intervention   Body structures, Functions, Activity limitations Decreased ADL status; Decreased ROM; Decreased strength;Decreased safe awareness;Decreased endurance;Decreased balance; Increased pain;Vestibular Impairment   Assessment Pt cont to require assist for all aspects of mobility, WB status advanced from [facility] to [facility] per RN and order in pt chart. Pt able to stand and step to/from [facility] with rwx but requires mod-maxA x2 d/t posterior lean and difficulty advancing BLE. Pt returned to supine following tx and positioned for comfort with all needs in reach. Activity Tolerance   Activity Tolerance Patient limited by pain; Patient limited by endurance   Safety Devices   Type of devices Bed alarm in place;Call light within reach;Gait belt;Left in bed;Nurse notified         Electronically signed by Xiomy Ruiz PTA on 6/29/2021 at 3:26 PM

## 2021-06-29 NOTE — PROGRESS NOTES
Occupational Therapy   Occupational Therapy Initial Assessment  Date: 2021   Patient Name: Margaret Feldman  MRN: [id number]     : 1940    Date of Service: 2021    Discharge Recommendations:  Home with Home health OT  OT Equipment Recommendations  Equipment Needed: Yes  Mobility Devices: Walker    Assessment   Assessment: OT evaluation completed: Treatment initiated: ADLS, Therapeutic activity for balance, training. Treatment Diagnosis: Weakness  Decision Making: Medium Complexity  REQUIRES OT FOLLOW UP: Yes  Activity Tolerance  Activity Tolerance: Treatment limited secondary to medical complications (free text)  Activity Tolerance: Pt reported vertigo and not able to stand. Patient Diagnosis(es): There were no encounter diagnoses. has a past medical history of Parkinson's disease ([facility] [address] has a past surgical history that includes Cholecystectomy; Hysterectomy; and Appendectomy. Treatment Diagnosis: Weakness      Restrictions  Restrictions/Precautions  Restrictions/Precautions: Weight Bearing, Fall Risk (LLE TTWB)  Lower Extremity Weight Bearing Restrictions  Left Lower Extremity Weight Bearing: Toe Touch Weight Bearing  Position Activity Restriction  Other position/activity restrictions: TTWB    Subjective   General  Chart Reviewed: Yes  Patient assessed for rehabilitation services?: Yes  Additional Pertinent Hx: Vertigo, Left hip fracture  Family / Caregiver Present: No  Referring Practitioner: Maria Victoria Larson MD  Diagnosis: Closed left hip fracture initial encounter. General Comment  Comments: Independent with ADLS before surgery and walked without assistive devices.   Patient Currently in Pain: Yes  Pain Assessment  Pain Assessment: 0-10  Pain Level: 6  Patient's Stated Pain Goal: No pain  Pain Type: Acute pain  Pain Location: Hip  Pain Orientation: Left  Pain Descriptors: Aching  Pain Frequency: Intermittent  Functional Pain Assessment: Prevents or interferes with many active not passive activities  Non-Pharmaceutical Pain Intervention(s): Repositioned  Multiple Pain Sites: No  Pre Treatment Pain Screening  Pain at present: 0  Comments / Details: Pt sat EOB with moderate assistance. Pt was not able to complete sit to stand due to her dizziness. Vital Signs  Patient Currently in Pain: Yes  Social/Functional History  Social/Functional History  Lives With: Spouse  Type of Home: House  Home Layout: One level  Home Access: Stairs to enter with rails  Entrance Stairs - Number of Steps: 1  Bathroom Shower/Tub: Tub/Shower unit  Bathroom Toilet: Handicap height  Bathroom Accessibility: Walker accessible  ADL Assistance: Independent  Homemaking Assistance: Independent  Homemaking Responsibilities: Yes  Ambulation Assistance: Independent  Transfer Assistance: Independent  Active : Yes  Mode of Transportation: Car  Occupation: Retired     Objective        Orientation  Overall Orientation Status: Within Normal Limits  Observation/Palpation  Posture: Fair  ADL  Feeding: Independent  Grooming: Moderate assistance  UE Bathing: Moderate assistance  LE Bathing: Modified independent   UE Dressing: Independent  LE Dressing: Modified independent   Toileting: Moderate assistance  Cognition  Overall Cognitive Status: WNL   Sensation  Overall Sensation Status: WNL  LUE AROM (degrees)  LUE AROM : WFL  RUE AROM (degrees)  RUE AROM : WFL  LUE Strength  LUE Strength Comment: 4/5  RUE Strength  RUE Strength Comment: 4/5      Treatment initiated: Therapeutic activity, ADLS (30 minutes)  Plan   Plan  Times per week: 3-5x wk  Plan Comment: OT to treat 3-5xwk for strengthening, balance training, ADLS and functional mobility. Goals  Short term goals  Time Frame for Short term goals: 2 weeks  Short term goal 1: To be able to complete toilet transfers with modified -[place]. Short term goal 2: To understand and be independent with adaptive equipment for ADLS.   Short term goal 3: To demonstrate LB dressing independently with the reacher. Short term goal 4: Pt to be independent with hip precautions.        Therapy Time   Individual Concurrent Group Co-treatment   Time In           Time Out           Minutes                   Moira Greco OT Electronically signed by ROBBIE Lebron MOT, OTR/L on 6/29/2021 at 11:05 AM

## 2021-06-30 LAB
ANION GAP SERPL CALCULATED.3IONS-SCNC: 8 MMOL/L (ref 7–19)
BUN BLDV-MCNC: 12 MG/DL (ref 8–23)
CALCIUM SERPL-MCNC: 7.6 MG/DL (ref 8.8–10.2)
CHLORIDE BLD-SCNC: 102 MMOL/L (ref 98–111)
CO2: 24 MMOL/L (ref 22–29)
CREAT SERPL-MCNC: 0.4 MG/DL (ref 0.5–0.9)
GFR AFRICAN AMERICAN: >59
GFR NON-AFRICAN AMERICAN: >60
GLUCOSE BLD-MCNC: 91 MG/DL (ref 74–109)
HCT VFR BLD CALC: 26.5 % (ref 37–47)
HEMOGLOBIN: 8.6 G/DL (ref 12–16)
MCH RBC QN AUTO: 30.5 PG (ref 27–31)
MCHC RBC AUTO-ENTMCNC: 32.5 G/DL (ref 33–37)
MCV RBC AUTO: 94 FL (ref 81–99)
PDW BLD-RTO: 13.2 % (ref 11.5–14.5)
PLATELET # BLD: 177 K/UL (ref 130–400)
PMV BLD AUTO: 10.2 FL (ref 9.4–12.3)
POTASSIUM REFLEX MAGNESIUM: 4 MMOL/L (ref 3.5–5)
RBC # BLD: 2.82 M/UL (ref 4.2–5.4)
SODIUM BLD-SCNC: 134 MMOL/L (ref 136–145)
WBC # BLD: 8.9 K/UL (ref 4.8–10.8)

## 2021-06-30 PROCEDURE — 80048 BASIC METABOLIC PNL TOTAL CA: CPT

## 2021-06-30 PROCEDURE — 6370000000 HC RX 637 (ALT 250 FOR IP): Performed by: ORTHOPAEDIC SURGERY

## 2021-06-30 PROCEDURE — 6360000002 HC RX W HCPCS: Performed by: ORTHOPAEDIC SURGERY

## 2021-06-30 PROCEDURE — 85027 COMPLETE CBC AUTOMATED: CPT

## 2021-06-30 PROCEDURE — 97530 THERAPEUTIC ACTIVITIES: CPT

## 2021-06-30 PROCEDURE — 36415 COLL VENOUS BLD VENIPUNCTURE: CPT

## 2021-06-30 PROCEDURE — 97535 SELF CARE MNGMENT TRAINING: CPT

## 2021-06-30 PROCEDURE — 6370000000 HC RX 637 (ALT 250 FOR IP): Performed by: STUDENT IN AN ORGANIZED HEALTH CARE EDUCATION/TRAINING PROGRAM

## 2021-06-30 PROCEDURE — 2580000003 HC RX 258: Performed by: ORTHOPAEDIC SURGERY

## 2021-06-30 PROCEDURE — 1210000000 HC MED SURG R&B

## 2021-06-30 PROCEDURE — 92526 ORAL FUNCTION THERAPY: CPT

## 2021-06-30 RX ADMIN — ENOXAPARIN SODIUM 30 MG: 30 INJECTION SUBCUTANEOUS at 08:24

## 2021-06-30 RX ADMIN — CARBIDOPA AND LEVODOPA 2 TABLET: 25; 100 TABLET ORAL at 08:24

## 2021-06-30 RX ADMIN — CARBIDOPA AND LEVODOPA 2 TABLET: 25; 100 TABLET ORAL at 21:45

## 2021-06-30 RX ADMIN — Medication 50 MG: at 08:22

## 2021-06-30 RX ADMIN — OXYCODONE HYDROCHLORIDE AND ACETAMINOPHEN 1 TABLET: 5; 325 TABLET ORAL at 14:23

## 2021-06-30 RX ADMIN — MULTIPLE VITAMINS W/ MINERALS TAB 1 TABLET: TAB at 08:23

## 2021-06-30 RX ADMIN — SODIUM CHLORIDE, PRESERVATIVE FREE 10 ML: 5 INJECTION INTRAVENOUS at 08:23

## 2021-06-30 RX ADMIN — CARBIDOPA AND LEVODOPA 2 TABLET: 25; 100 TABLET ORAL at 14:21

## 2021-06-30 RX ADMIN — OXYCODONE HYDROCHLORIDE AND ACETAMINOPHEN 1 TABLET: 5; 325 TABLET ORAL at 18:27

## 2021-06-30 RX ADMIN — OXYCODONE HYDROCHLORIDE AND ACETAMINOPHEN 1 TABLET: 5; 325 TABLET ORAL at 08:21

## 2021-06-30 ASSESSMENT — PAIN DESCRIPTION - PAIN TYPE
TYPE: ACUTE PAIN
TYPE: ACUTE PAIN

## 2021-06-30 ASSESSMENT — PAIN DESCRIPTION - LOCATION
LOCATION: HIP
LOCATION: HIP

## 2021-06-30 ASSESSMENT — PAIN DESCRIPTION - FREQUENCY
FREQUENCY: INTERMITTENT
FREQUENCY: CONTINUOUS

## 2021-06-30 ASSESSMENT — PAIN DESCRIPTION - PROGRESSION: CLINICAL_PROGRESSION: NOT CHANGED

## 2021-06-30 ASSESSMENT — PAIN DESCRIPTION - ORIENTATION
ORIENTATION: LEFT
ORIENTATION: LEFT

## 2021-06-30 ASSESSMENT — PAIN DESCRIPTION - DESCRIPTORS
DESCRIPTORS: ACHING;SORE
DESCRIPTORS: ACHING;DISCOMFORT

## 2021-06-30 ASSESSMENT — PAIN SCALES - GENERAL
PAINLEVEL_OUTOF10: 3
PAINLEVEL_OUTOF10: 4
PAINLEVEL_OUTOF10: 7
PAINLEVEL_OUTOF10: 4
PAINLEVEL_OUTOF10: 4

## 2021-06-30 ASSESSMENT — PAIN - FUNCTIONAL ASSESSMENT: PAIN_FUNCTIONAL_ASSESSMENT: PREVENTS OR INTERFERES WITH MANY ACTIVE NOT PASSIVE ACTIVITIES

## 2021-06-30 NOTE — CARE COORDINATION
Pt has accepted the bed offer from [facility] in [facility]. Admissions will start the pre-cert process with Pt insurance. Admissions will notify SW once pre-cert has cleared.   [facility]   P  [phone] F  Electronically signed by Tello Neri on 6/30/2021 at 1:15 PM

## 2021-06-30 NOTE — PROGRESS NOTES
Physician Progress Note      Donald Mena  CSN #:                  [id number]  :                       1940  ADMIT DATE:       2021 8:10 AM  DISCH DATE:  RESPONDING  PROVIDER #:        Matias Blas MD          QUERY TEXT:    Pt admitted with femur fracture. Pt noted to have drop in H&H. Repair   procedure, IM nailing of left intertrochanteric femur fracture. If possible,   please document in the progress notes and discharge summary if you are   evaluating and/or treating any of the following: The medical record reflects the following:  Risk Factors: Left femur fracture, IM nailing of intertrochanteric femur   fracture  Clinical Indicators: H&H 11.5/35.4 10.6/33.3 9.3/28.7 8.6/26.5  Treatment: Serial CBCs, 0.9% NS @ 125 ml/hr., LR given IV intraoperative. Options provided:  -- Acute blood loss anemia  -- Postoperative acute blood loss anemia  -- Dilutional anemia  -- Other - I will add my own diagnosis  -- Disagree - Not applicable / Not valid  -- Disagree - Clinically unable to determine / Unknown  -- Refer to Clinical Documentation Reviewer    PROVIDER RESPONSE TEXT:    This patient has postoperative acute blood loss anemia.     Query created by: Eddie Lindsay on 2021 1:31 PM      Electronically signed by:  Matias Blas MD 2021 3:45 PM

## 2021-06-30 NOTE — PROGRESS NOTES
normal and good eye contact, behavioral normal.        Labs/Imaging/Diagnostics    Labs:  CBC:  Recent Labs     06/28/21  0458 06/29/21  0329 06/30/21  0309   WBC 8.3 9.9 8.9   RBC 3.52* 3.05* 2.82*   HGB 10.6* 9.3* 8.6*   HCT 33.3* 28.7* 26.5*   MCV 94.6 94.1 94.0   RDW 13.7 13.5 13.2    179 177     CHEMISTRIES:  Recent Labs     06/28/21  0458 06/29/21  0329 06/30/21  0310    138 134*   K 4.4 4.5 4.0    104 102   CO2 26 26 24   BUN 15 11 12   CREATININE 0.5 0.5 0.4*   GLUCOSE 96 100 91     PT/INR:No results for input(s): PROTIME, INR in the last 72 hours. APTT:No results for input(s): APTT in the last 72 hours. LIVER PROFILE:No results for input(s): AST, ALT, BILIDIR, BILITOT, ALKPHOS in the last 72 hours. Imaging Last 24 Hours:  XR HIP 2-3 VW W PELVIS LEFT    Result Date: 6/28/2021  EXAMINATION: C-arm in OR left hip 6/28/2021 Fluoroscopy time: 1 minute 3.1 seconds. 6 images are submitted for interpretation. Dose: 16.330 mGy. FINDINGS: C-arm was used intraoperatively during open reduction and internal fixation for an intertrochanteric fracture of the left hip. There is good restoration of anatomic alignment with no evidence of complications. The films are available to the OR for review.  Signed by Dr Sylvie Davis Problems         Last Modified POA    * (Principal) Closed left hip fracture, initial encounter ([facility] [address] 6/27/2021 Yes    Parkinson's disease ([facility] [address] 6/27/2021 Yes    Postoperative anemia 6/29/2021 Yes        Assessment & Plan      Left intertrochanteric hip fracture  -s/p CMN 6/28  -Monitor postoperatively  -Pain control  -Supportive care  -PT/OT      Post-op anemia, component of hemodilution  -monitor CBC  -transfuse for Hgb <7     Parkinson's disease  -Continue home Sinemet     Vitamin D deficiency  -Start vitamin D replacement        DVT prophylaxis Lovenox per orthopedic surgery  Accepted to [facility] 8/94/64, awaiting Precert      Electronically signed by   Bernard Merino MD   Internal Medicine Hospitalist  On 6/30/2021  At 3:25 PM    EMR Dragon/Transcription disclaimer:   Much of this encounter note is an electronic transcription/translation of spoken language to printed text.  The electronic translation of spoken language may permit erroneous, or at times, nonsensical words or phrases to be inadvertently transcribed; although attempts have made to review the note for such errors, some may still exist.

## 2021-06-30 NOTE — PROGRESS NOTES
Occupational Therapy     06/30/21 1622   Pre Treatment Pain Screening   Pain at present 4   Restrictions/Precautions   Restrictions/Precautions Weight Bearing; Fall Risk   Position Activity Restriction   Other position/activity restrictions WBAT   Vision   Vision [facility]   Hearing   Hearing [facility]   General   Chart Reviewed Yes   Patient assessed for rehabilitation services? Yes   Additional Pertinent Hx Vertigo, Left hip fracture   Response to previous treatment Patient with no complaints from previous session   Family / Caregiver Present No   Referring Practitioner Esteban Jaime MD   Diagnosis Closed left hip fracture initial encounter. Subjective   Subjective Pt in bed upon arrival for therapy and wants to use BSC. General Comment   Comments Pt completes movements very slowly due to pain and increased anxiety over pain. Pain Assessment   Patient Currently in Pain Yes   Pain Assessment 0-10   Pain Level 4   Pain Type Acute pain   Pain Location Hip   Pain Orientation Left   Pain Descriptors Aching;Discomfort   Pain Frequency Continuous   Clinical Progression Not changed   Patient's Stated Pain Goal 1   Response to Pain Intervention Patient Satisfied   Pre Treatment Pain Screening   Pain at present 4   Scale Used Numeric Score   Intervention List Patient able to continue with treatment   ADL   Feeding Setup   Grooming Setup   UE Bathing Stand by assistance   LE Bathing Maximum assistance   UE Dressing Stand by assistance   LE Dressing Maximum assistance   Toileting Moderate assistance  (Requires Mod assist for arnel care and clothing management )   Balance   Sitting Balance Supervision   Standing Balance Minimal assistance   Bed mobility   Rolling to Right Moderate assistance   Supine to Sit Moderate assistance   Sit to Supine Maximum assistance   Scooting Maximal assistance; Moderate assistance   Transfers   Stand Step Transfers Minimal assistance  (with cues )   Sit to stand Moderate assistance   Stand to sit Minimal assistance   Toilet Transfers   Toilet - Technique Stand step   Equipment Used Standard bedside commode   Toilet Transfer Minimal assistance   Assessment   Performance deficits / Impairments Decreased functional mobility ; Decreased ADL status; Decreased strength;Decreased endurance;Decreased balance;Decreased high-level IADLs   Assessment Pt progressing as tolerated; limited by pain. Treatment Diagnosis Weakness   Prognosis Good   REQUIRES OT FOLLOW UP Yes   Treatment Initiated  Tx focused on use of BSC this date. Discharge Recommendations Home with Home health OT   Activity Tolerance   Activity Tolerance Patient limited by pain   Safety Devices   Safety Devices in place Yes   Type of devices Bed alarm in place;Call light within reach   Plan   Times per week 3-5x wk   Times per day Daily   Plan Comment OT to treat 3-5xwk for strengthening, balance training, ADLS and functional mobility.    Electronically signed by SEAN Calle on 6/30/2021 at 4:31 PM

## 2021-06-30 NOTE — PROGRESS NOTES
Speech Language Pathology  Facility/Department: [facility] SURG SERVICES  Dysphagia Daily Treatment Note    NAME: Kym Saavedra  : 1940  MRN: [id number]    Patient Diagnosis(es):   Patient Active Problem List    Diagnosis Date Noted    Postoperative anemia 2021    Closed left hip fracture, initial encounter ([facility] 2021    Parkinson's disease ([facility] 2017    Tremor 2017    Gait abnormality 2017     Allergies: No Known Allergies    Current Diet Level:  Regular diet, thin liquids    Pain:  Pain Assessment  Pain Assessment: 0-10  Pain Level: 3    Diet Tolerance:  Patient tolerating current diet level without signs/symptoms of penetration/aspiration. Subjective:  Pt alert and sitting upright in bed with meal tray. Pt reported she has HIP pain and wants PT to work with her to the bedside commode. SLP relayed information to PT. Pt reported no changes with swallow function. Pt discussed a typical appetite, with small meals when in her home environment. She has no hx of dysphagia or difficulty taking medications. Objective:  Pt consumed sips of thin liquids via straw. LE functional for airway protection. Swallow initiation timely. No overt s/s of aspiration. Pt consumed small bite of regular diet texture. Functional oral phase and mastication process observed. Min to no oral residue. Pt without overt s/s of aspiration. Impressions:  Pt tolerating regular diet and thin liquids. Pt had initial swallow evaluation due to poor PO intake and NPO status due to surgery. Post surgery pt has improved appetite and no changes with swallow function. Pt takes medications whole with water. SLP recommends to continue thin liquids with regular diet texture. SLP will d/c pt from list due to no deficits with swallow function and improved PO status. Oral Motor / Bolus Control:  No deficits    Patient/Family/Caregiver Education:  Education on d/c from [address] [place] [address]   Pt agreed    Recommended Diet and Intervention  Diet Solids Recommendation: Regular solid   Liquid Consistency Recommendation: Thin  Recommended Form of Meds: Meds whole with water  Therapeutic Interventions: Patient/Family education;Diet tolerance monitoring     Compensatory Swallowing Strategies  Compensatory Swallowing Strategies: Upright as possible for all oral intake;Assist in meal set-up; Small bites/sips;Eat/Feed slowly; Alternate solids and liquids; Remain upright for 30-45 minutes after meals     Treatment/Goals  Timeframe for Short-term Goals: 1x/day for 3 days   Goal 1: Patient will tolerate regular solid consistency and thin liquids with min S/S penetration/aspiration during PO intake. Goal 2: Patient staff will follow swallow safety recommendations to decrease risk of penetration/aspiration during PO intake. Plan:  Continued daily Dysphagia treatment with goals per plan of care. SLP recommends to continue thin liquids with regular diet texture. SLP will d/c pt from list due to no deficits with swallow function and improved PO status.        ALAN Laureano   Electronically signed by Justin Branham on 6/30/21 at 1:04 PM CDT

## 2021-06-30 NOTE — CARE COORDINATION
Galen Triana is not in network with [employer]. Pt stated she would like to look at facilities in the [place] area. SW has also reached out to Pt family regarding options. Awaiting call back from Pt family at this time.    Electronically signed by Hilda Garcia on 6/30/2021 at 10:25 AM

## 2021-06-30 NOTE — PROGRESS NOTES
Physical Therapy  Name: Winston Dan  MRN:  [id number]  Date of service:  6/30/2021 06/30/21 1204   Subjective   Subjective Attempt in a.m.: Pt declines OOB at this time. Will cont to follow.          Electronically signed by Zee Williamson PTA on 6/30/2021 at 12:04 PM

## 2021-06-30 NOTE — PROGRESS NOTES
Physical Therapy  Name: Alaina Verduzco  MRN:  [id number]  Date of service:  6/30/2021 06/30/21 1333   Restrictions/Precautions   Restrictions/Precautions Weight Bearing; Fall Risk   Lower Extremity Weight Bearing Restrictions   Left Lower Extremity Weight Bearing Weight Bearing As Tolerated   Position Activity Restriction   Other position/activity restrictions WBAT   General   Chart Reviewed Yes   Subjective   Subjective Pt states she needs to use the [facility]. Pain Screening   Patient Currently in Pain Yes   Pain Assessment   Pain Assessment 0-10   Pain Level 7  (with movement)   Pain Type Acute pain   Pain Location Hip   Pain Orientation Left   Pain Descriptors Aching; Sore   Pain Frequency Intermittent   Functional Pain Assessment Prevents or interferes with many active not passive activities   Non-Pharmaceutical Pain Intervention(s) Ambulation/Increased Activity;Repositioned; Rest   Response to Pain Intervention Patient Satisfied  (once positioned for comfort)   Bed Mobility   Supine to Sit Moderate assistance  (HOB elevated)   Sit to Supine Minimal assistance  (BLE management)   Scooting Maximal assistance;2 Person assistance   Transfers   Sit to Stand Moderate Assistance;2 Person Assistance   Stand to sit Minimal Assistance;2 Person Assistance   Bed to Chair Minimal assistance;2 Person Assistance   Stand Pivot Transfers Minimal Assistance;2 Person Assistance  (stand/step with rwx)   Comment bed<>BSC, modA for standing balance d/t posterior lean, requires assist for hygiene after toileting   Ambulation   Ambulation? No   Short term goals   Time Frame for Short term goals 14 days   Short term goal 1 supine to sit modified independent   Short term goal 2 Sit to stand SBA   Short term goal 3 Bed to chair MIN   Conditions Requiring Skilled Therapeutic Intervention   Body structures, Functions, Activity limitations Decreased ADL status; Decreased ROM; Decreased strength;Decreased safe awareness;Decreased endurance;Decreased balance; Increased pain;Vestibular Impairment   Assessment Assisted pt on/off BSC, pt cont to require assist for all aspects of mobility but showing slight improvement with taking small steps. Pt declined up to chair, returned to supine and positioned for comfort with all needs in reach. Activity Tolerance   Activity Tolerance Patient limited by pain; Patient limited by endurance   Safety Devices   Type of devices Bed alarm in place;Call light within reach;Gait belt;Left in bed         Electronically signed by Lisseth Banks PTA on 6/30/2021 at 1:41 PM

## 2021-07-01 VITALS
HEIGHT: 64 IN | DIASTOLIC BLOOD PRESSURE: 70 MMHG | OXYGEN SATURATION: 94 % | RESPIRATION RATE: 18 BRPM | WEIGHT: 153.3 LBS | SYSTOLIC BLOOD PRESSURE: 136 MMHG | BODY MASS INDEX: 26.17 KG/M2 | HEART RATE: 100 BPM | TEMPERATURE: 97.6 F

## 2021-07-01 LAB
ANION GAP SERPL CALCULATED.3IONS-SCNC: 10 MMOL/L (ref 7–19)
BUN BLDV-MCNC: 11 MG/DL (ref 8–23)
CALCIUM SERPL-MCNC: 7.7 MG/DL (ref 8.8–10.2)
CHLORIDE BLD-SCNC: 103 MMOL/L (ref 98–111)
CO2: 24 MMOL/L (ref 22–29)
CREAT SERPL-MCNC: 0.4 MG/DL (ref 0.5–0.9)
GFR AFRICAN AMERICAN: >59
GFR NON-AFRICAN AMERICAN: >60
GLUCOSE BLD-MCNC: 88 MG/DL (ref 74–109)
HCT VFR BLD CALC: 25.2 % (ref 37–47)
HEMOGLOBIN: 8.2 G/DL (ref 12–16)
MCH RBC QN AUTO: 30.6 PG (ref 27–31)
MCHC RBC AUTO-ENTMCNC: 32.5 G/DL (ref 33–37)
MCV RBC AUTO: 94 FL (ref 81–99)
PDW BLD-RTO: 13.6 % (ref 11.5–14.5)
PLATELET # BLD: 199 K/UL (ref 130–400)
PMV BLD AUTO: 10.4 FL (ref 9.4–12.3)
POTASSIUM REFLEX MAGNESIUM: 3.8 MMOL/L (ref 3.5–5)
RBC # BLD: 2.68 M/UL (ref 4.2–5.4)
SARS-COV-2, NAAT: NOT DETECTED
SODIUM BLD-SCNC: 137 MMOL/L (ref 136–145)
WBC # BLD: 8.1 K/UL (ref 4.8–10.8)

## 2021-07-01 PROCEDURE — 80048 BASIC METABOLIC PNL TOTAL CA: CPT

## 2021-07-01 PROCEDURE — 87635 SARS-COV-2 COVID-19 AMP PRB: CPT

## 2021-07-01 PROCEDURE — 6370000000 HC RX 637 (ALT 250 FOR IP): Performed by: STUDENT IN AN ORGANIZED HEALTH CARE EDUCATION/TRAINING PROGRAM

## 2021-07-01 PROCEDURE — 6370000000 HC RX 637 (ALT 250 FOR IP): Performed by: ORTHOPAEDIC SURGERY

## 2021-07-01 PROCEDURE — 85027 COMPLETE CBC AUTOMATED: CPT

## 2021-07-01 PROCEDURE — 6360000002 HC RX W HCPCS: Performed by: ORTHOPAEDIC SURGERY

## 2021-07-01 PROCEDURE — 2580000003 HC RX 258: Performed by: ORTHOPAEDIC SURGERY

## 2021-07-01 PROCEDURE — 36415 COLL VENOUS BLD VENIPUNCTURE: CPT

## 2021-07-01 RX ORDER — ASPIRIN 325 MG
325 TABLET, DELAYED RELEASE (ENTERIC COATED) ORAL DAILY
Qty: 30 TABLET | Refills: 0
Start: 2021-07-01 | End: 2021-07-31

## 2021-07-01 RX ORDER — PANTOPRAZOLE SODIUM 40 MG/1
40 TABLET, DELAYED RELEASE ORAL
Qty: 30 TABLET | Refills: 0
Start: 2021-07-01

## 2021-07-01 RX ORDER — OXYCODONE HYDROCHLORIDE AND ACETAMINOPHEN 5; 325 MG/1; MG/1
1 TABLET ORAL EVERY 6 HOURS PRN
Qty: 12 TABLET | Refills: 0 | Status: SHIPPED | OUTPATIENT
Start: 2021-07-01 | End: 2021-07-04

## 2021-07-01 RX ORDER — SENNA PLUS 8.6 MG/1
1 TABLET ORAL DAILY
Qty: 30 TABLET | Refills: 0
Start: 2021-07-01 | End: 2021-07-31

## 2021-07-01 RX ADMIN — Medication 50 MG: at 09:19

## 2021-07-01 RX ADMIN — SODIUM CHLORIDE, PRESERVATIVE FREE 10 ML: 5 INJECTION INTRAVENOUS at 09:20

## 2021-07-01 RX ADMIN — Medication 4 MG: at 07:13

## 2021-07-01 RX ADMIN — CARBIDOPA AND LEVODOPA 2 TABLET: 25; 100 TABLET ORAL at 15:30

## 2021-07-01 RX ADMIN — OXYCODONE HYDROCHLORIDE AND ACETAMINOPHEN 1 TABLET: 5; 325 TABLET ORAL at 15:30

## 2021-07-01 RX ADMIN — ENOXAPARIN SODIUM 30 MG: 30 INJECTION SUBCUTANEOUS at 09:19

## 2021-07-01 RX ADMIN — MULTIPLE VITAMINS W/ MINERALS TAB 1 TABLET: TAB at 09:19

## 2021-07-01 RX ADMIN — OXYCODONE HYDROCHLORIDE AND ACETAMINOPHEN 1 TABLET: 5; 325 TABLET ORAL at 01:17

## 2021-07-01 RX ADMIN — CARBIDOPA AND LEVODOPA 2 TABLET: 25; 100 TABLET ORAL at 09:19

## 2021-07-01 RX ADMIN — OXYCODONE HYDROCHLORIDE AND ACETAMINOPHEN 1 TABLET: 5; 325 TABLET ORAL at 09:20

## 2021-07-01 ASSESSMENT — PAIN SCALES - GENERAL
PAINLEVEL_OUTOF10: 4
PAINLEVEL_OUTOF10: 4
PAINLEVEL_OUTOF10: 7
PAINLEVEL_OUTOF10: 8
PAINLEVEL_OUTOF10: 4

## 2021-07-01 ASSESSMENT — PAIN DESCRIPTION - PROGRESSION: CLINICAL_PROGRESSION: NOT CHANGED

## 2021-07-01 ASSESSMENT — PAIN DESCRIPTION - FREQUENCY: FREQUENCY: CONTINUOUS

## 2021-07-01 ASSESSMENT — PAIN DESCRIPTION - PAIN TYPE: TYPE: ACUTE PAIN

## 2021-07-01 ASSESSMENT — PAIN DESCRIPTION - DESCRIPTORS: DESCRIPTORS: ACHING;DISCOMFORT

## 2021-07-01 ASSESSMENT — PAIN DESCRIPTION - ORIENTATION: ORIENTATION: LEFT

## 2021-07-01 ASSESSMENT — PAIN - FUNCTIONAL ASSESSMENT: PAIN_FUNCTIONAL_ASSESSMENT: PREVENTS OR INTERFERES SOME ACTIVE ACTIVITIES AND ADLS

## 2021-07-01 ASSESSMENT — PAIN DESCRIPTION - LOCATION: LOCATION: HIP

## 2021-07-01 NOTE — DISCHARGE INSTR - DIET

## 2021-07-01 NOTE — PROGRESS NOTES
Covid test came back negative and all necessary paperwork faxed to [facility] and hard copies sent with patient. Report called to [address] Valorie Toledo RN at [facility], no questions or concerns expressed at this time. Patient aware of transfer to facility. [facility] called and awaiting transport at this time. Called and updated daughter Renae Rey on transfer, she has no issues with the move. Patient is discharging to [facility] in stable condition.

## 2021-07-01 NOTE — PROGRESS NOTES
covid swab obtained from left nare, no complications and patient tolerated well, swab delivered to lab awaiting results at this time.

## 2021-07-01 NOTE — PROGRESS NOTES
EMS here to  patient, iv was removed from left arm no complications and catheter intact. Patient transferring in stable condition to [facility].

## 2021-07-01 NOTE — CARE COORDINATION
Pt has accepted the bed offer from [facility] in [facility]. Pre-cert has been approved by [employer]. Will need a Covid Test prior to Discharge. She can go 7/01/2021.     Manpreet GILLESPIE  Electronically signed by Donovan Min RN, BSN on 7/1/2021 at 1:02 PM

## 2021-07-01 NOTE — DISCHARGE SUMMARY
Discharge Summary      Date:7/1/2021        Patient Kane Renteria     YOB: 1940     Age:80 y.o. Admit Date:6/27/2021   Admission Condition:fair   Discharged Condition:fair  Discharge Date: 07/01/21       Discharge Diagnoses   Principal Problem:    Closed left hip fracture, initial encounter [facility])  Active Problems:    Parkinson's disease ([address]    Postoperative anemia  Resolved Problems:    * No resolved hospital problems. [facility] AND CLINICS Stay   Narrative of Hospital Course:     71-year-old female with Parkinson's disease followed by neurology (Dr. Jose Pérez) on Sinemet, presented as transfer from outside facility after she sustained a fall at home, landed on her left side with pain and inability to bear weight, with plain films showing left intertrochanteric hip fracture.  Orthopedic surgery evaluated and s/p surgical repair 6/28 s/p CMN.  Post-op anemia noted, but stable.  PT/OT postoperatively.  Accepted to SNF. Patient was initiated on aspirin 325 mg on discharge for DVT prophylaxis as well as pantoprazole 40 mg before breakfast for GI prophylaxis while taking high-dose aspirin. To follow-up outpatient with PCP for continuous management of chronic medical problems. Physical Examination:  General: Well-developed, no acute distress lying comfortably in bed. HEENT: Atraumatic normocephalic, range of motion normal  Cardiac: Normal V0-M3, with systolic murmur  Respiratory: clear To auscultation bilaterally, no rhonchi or rales, no wheezing  Abdomen: Soft, positive bowel sounds in all quadrants, no distention, nontender to palpation, no organomegaly noted, rebound noted.     Extremities: no tenderness, no edema, moves all extremities  Psych: Affect normal and good eye contact, behavioral normal.      Consultants:   IP CONSULT TO ORTHOPEDIC SURGERY  IP CONSULT TO SOCIAL WORK    Time Spent on Discharge:  40 minutes were spent in patient examination, evaluation, counseling as well as medication reconciliation, prescriptions for required medications, discharge plan and follow up. Surgeries/Procedures Performed:  Procedure(s):  SHORT TFN       Significant Diagnostic Studies:   Recent Labs:  CBC:   Lab Results   Component Value Date    WBC 8.1 07/01/2021    RBC 2.68 07/01/2021    HGB 8.2 07/01/2021    HCT 25.2 07/01/2021    MCV 94.0 07/01/2021    MCH 30.6 07/01/2021    MCHC 32.5 07/01/2021    RDW 13.6 07/01/2021     07/01/2021     BMP:    Lab Results   Component Value Date    GLUCOSE 88 07/01/2021     07/01/2021    K 3.8 07/01/2021     07/01/2021    CO2 24 07/01/2021    ANIONGAP 10 07/01/2021    BUN 11 07/01/2021    CREATININE 0.4 07/01/2021    CALCIUM 7.7 07/01/2021    LABGLOM >60 07/01/2021    GFRAA >59 07/01/2021       Radiology Last 7 Days:  XR HIP BILATERAL W AP PELVIS (2 VIEWS)    Result Date: 6/27/2021  Impression: 1. Intertrochanteric left hip fracture. Signed by Dr Kym Parekh      Discharge Plan   Disposition: To a non-[facility] facility    Provider Follow-Up:   Dondra Schirmer, PA-C [phone]    In 2 weeks  facility to call and make an appointment with Elza Leon at [facility] for 2 weeks    facility provider      follow up with facility provider upon arrival    Ruthy Laura  [facility] [id number]  [phone]    Schedule an appointment as soon as possible for a visit in 1 week  post hospital visit within 1 week after discharge from facility         Patient Instructions   Diet: regular diet    Activity: activity as tolerated      Discharge Medications         Medication List      START taking these medications    aspirin 325 MG EC tablet  Take 1 tablet by mouth daily     oxyCODONE-acetaminophen 5-325 MG per tablet  Commonly known as: PERCOCET  Take 1 tablet by mouth every 6 hours as needed for Pain for up to 3 days.      pantoprazole 40 MG tablet  Commonly known as: PROTONIX  Take 1 tablet by mouth every morning (before breakfast)     senna 8.6 MG tablet  Commonly known as: Senokot  Take 1 tablet by mouth daily        CONTINUE taking these medications    carbidopa-levodopa  MG per tablet  Commonly known as: SINEMET  TAKE 2 TABLETS BY MOUTH 3 TIMES A DAY     therapeutic multivitamin-minerals tablet     vitamin B-6 50 MG tablet  Commonly known as: PYRIDOXINE           Where to Get Your Medications      You can get these medications from any pharmacy    Bring a paper prescription for each of these medications  · oxyCODONE-acetaminophen 5-325 MG per tablet     Information about where to get these medications is not yet available    Ask your nurse or doctor about these medications  · aspirin 325 MG EC tablet  · pantoprazole 40 MG tablet  · senna 8.6 MG tablet         Electronically signed by Valentina Seymour MD on 7/1/21 at 1:29 PM CDT

## (undated) DEVICE — Z DISCONTINUED USE 2272117 DRAPE SURG 3 QTR N INVASIVE 2 LAYR DISP

## (undated) DEVICE — SUTURE VCRL SZ 2-0 L36IN ABSRB UD L36MM CT-1 1/2 CIR J945H

## (undated) DEVICE — BLADE LARYNSCP HNDL MAC 3 DISP CURAVIEW LED

## (undated) DEVICE — GLOVE SURG SZ 65 CRM LTX FREE POLYISOPRENE POLYMER BEAD ANTI

## (undated) DEVICE — C-ARMOR C-ARM EQUIPMENT COVERS CLEAR STERILE UNIVERSAL FIT 12 PER CASE: Brand: C-ARMOR

## (undated) DEVICE — BIT DRL L300MM DIA10MM CANN TAPR L QUIK CPL FOR DH DC TFN

## (undated) DEVICE — SUTURE VCRL SZ 3-0 L27IN ABSRB UD L26MM SH 1/2 CIR J416H

## (undated) DEVICE — TUBE ET 7MM NSL ORAL BASIC CUF INTMED MURPHY EYE RADPQ MRK

## (undated) DEVICE — UNDERGLOVE SURG SZ 8 FNGR THK0.21MIL GRN LTX BEAD CUF

## (undated) DEVICE — 6617 IOBAN II PATIENT ISOLATION DRAPE 5/BX,4BX/CS: Brand: STERI-DRAPE™ IOBAN™ 2

## (undated) DEVICE — DRESSING BORDERED ADH GZ UNIV GEN USE 8INX4IN AND 6INX2IN

## (undated) DEVICE — GUIDEWIRE ORTH L400MM DIA3.2MM FOR TFN

## (undated) DEVICE — GLOVE SURG SZ 7 CRM LTX FREE POLYISOPRENE POLYMER BEAD ANTI

## (undated) DEVICE — C-ARM: Brand: UNBRANDED

## (undated) DEVICE — Z DISCONTINUED USE 2429233 DRESSING FOAM W10XL10CM 5 LAYR SELF ADH VERSATILE SAFETAC

## (undated) DEVICE — ROD RMR L950MM DIA2.5MM W/ EXTN BALL TIP

## (undated) DEVICE — SYSTEM SKIN CLSR 22CM DERMBND PRINEO

## (undated) DEVICE — STERILE POLYISOPRENE POWDER-FREE SURGICAL GLOVES: Brand: PROTEXIS

## (undated) DEVICE — SUTURE VCRL SZ 0 L27IN ABSRB UD L36MM CT-1 1/2 CIR J260H

## (undated) DEVICE — COVER POS PERINL POST NS 082501

## (undated) DEVICE — SURGICAL PROCEDURE PACK LOWER EXTREMITY LOURDES HOSP

## (undated) DEVICE — BIT DRL L330MM DIA4.2MM CALIB 100MM 3 FLUT QUIK CPL

## (undated) DEVICE — SOLUTION IV IRRIG POUR BRL 0.9% SODIUM CHL 2F7124